# Patient Record
Sex: MALE | Race: WHITE | NOT HISPANIC OR LATINO | Employment: OTHER | URBAN - METROPOLITAN AREA
[De-identification: names, ages, dates, MRNs, and addresses within clinical notes are randomized per-mention and may not be internally consistent; named-entity substitution may affect disease eponyms.]

---

## 2022-12-06 ENCOUNTER — HOSPITAL ENCOUNTER (OUTPATIENT)
Facility: HOSPITAL | Age: 62
Discharge: HOME | End: 2022-12-07
Attending: STUDENT IN AN ORGANIZED HEALTH CARE EDUCATION/TRAINING PROGRAM | Admitting: HOSPITALIST
Payer: COMMERCIAL

## 2022-12-06 DIAGNOSIS — R47.81 SLURRED SPEECH: ICD-10-CM

## 2022-12-06 DIAGNOSIS — G45.9 TIA (TRANSIENT ISCHEMIC ATTACK): ICD-10-CM

## 2022-12-06 DIAGNOSIS — R42 VERTIGO: ICD-10-CM

## 2022-12-06 DIAGNOSIS — I63.9 CVA (CEREBRAL VASCULAR ACCIDENT): ICD-10-CM

## 2022-12-06 DIAGNOSIS — I48.91 ATRIAL FIBRILLATION, UNSPECIFIED TYPE: Primary | ICD-10-CM

## 2022-12-06 PROBLEM — R29.810 FACIAL DROOP: Status: ACTIVE | Noted: 2022-12-06

## 2022-12-06 LAB
ALBUMIN SERPL-MCNC: 3.9 GM/DL (ref 3.4–4.8)
ALBUMIN/GLOB SERPL: 1.1 RATIO (ref 1.1–2)
ALP SERPL-CCNC: 57 UNIT/L (ref 40–150)
ALT SERPL-CCNC: 25 UNIT/L (ref 0–55)
APPEARANCE UR: CLEAR
APTT PPP: 27.3 SECONDS (ref 23.2–33.7)
AST SERPL-CCNC: 25 UNIT/L (ref 5–34)
BACTERIA #/AREA URNS AUTO: ABNORMAL /HPF
BASOPHILS # BLD AUTO: 0.03 X10(3)/MCL (ref 0–0.2)
BASOPHILS NFR BLD AUTO: 0.3 %
BILIRUB UR QL STRIP.AUTO: NEGATIVE MG/DL
BILIRUBIN DIRECT+TOT PNL SERPL-MCNC: 1.1 MG/DL
BNP BLD-MCNC: 48.1 PG/ML
BUN SERPL-MCNC: 17.5 MG/DL (ref 8.4–25.7)
CALCIUM SERPL-MCNC: 10.4 MG/DL (ref 8.8–10)
CHLORIDE SERPL-SCNC: 105 MMOL/L (ref 98–107)
CHOLEST SERPL-MCNC: 152 MG/DL
CHOLEST/HDLC SERPL: 4 {RATIO} (ref 0–5)
CO2 SERPL-SCNC: 24 MMOL/L (ref 23–31)
COLOR UR AUTO: YELLOW
CREAT SERPL-MCNC: 1.03 MG/DL (ref 0.73–1.18)
EOSINOPHIL # BLD AUTO: 0.12 X10(3)/MCL (ref 0–0.9)
EOSINOPHIL NFR BLD AUTO: 1.3 %
ERYTHROCYTE [DISTWIDTH] IN BLOOD BY AUTOMATED COUNT: 13.4 % (ref 11.5–17)
GFR SERPLBLD CREATININE-BSD FMLA CKD-EPI: >60 MLS/MIN/1.73/M2
GLOBULIN SER-MCNC: 3.7 GM/DL (ref 2.4–3.5)
GLUCOSE SERPL-MCNC: 86 MG/DL (ref 82–115)
GLUCOSE UR QL STRIP.AUTO: NEGATIVE MG/DL
HCT VFR BLD AUTO: 45.5 % (ref 42–52)
HDLC SERPL-MCNC: 41 MG/DL (ref 35–60)
HGB BLD-MCNC: 14.8 GM/DL (ref 14–18)
IMM GRANULOCYTES # BLD AUTO: 0.03 X10(3)/MCL (ref 0–0.04)
IMM GRANULOCYTES NFR BLD AUTO: 0.3 %
INR BLD: 1.05 (ref 0–1.3)
KETONES UR QL STRIP.AUTO: NEGATIVE MG/DL
LDLC SERPL CALC-MCNC: 88 MG/DL (ref 50–140)
LEUKOCYTE ESTERASE UR QL STRIP.AUTO: ABNORMAL UNIT/L
LYMPHOCYTES # BLD AUTO: 2.98 X10(3)/MCL (ref 0.6–4.6)
LYMPHOCYTES NFR BLD AUTO: 32.6 %
MCH RBC QN AUTO: 31 PG (ref 27–31)
MCHC RBC AUTO-ENTMCNC: 32.5 MG/DL (ref 33–36)
MCV RBC AUTO: 95.2 FL (ref 80–94)
MONOCYTES # BLD AUTO: 1 X10(3)/MCL (ref 0.1–1.3)
MONOCYTES NFR BLD AUTO: 10.9 %
NEUTROPHILS # BLD AUTO: 5 X10(3)/MCL (ref 2.1–9.2)
NEUTROPHILS NFR BLD AUTO: 54.6 %
NITRITE UR QL STRIP.AUTO: NEGATIVE
NRBC BLD AUTO-RTO: 0 %
PH UR STRIP.AUTO: 6 [PH]
PLATELET # BLD AUTO: 246 X10(3)/MCL (ref 130–400)
PMV BLD AUTO: 9.8 FL (ref 7.4–10.4)
POTASSIUM SERPL-SCNC: 4 MMOL/L (ref 3.5–5.1)
PROT SERPL-MCNC: 7.6 GM/DL (ref 5.8–7.6)
PROT UR QL STRIP.AUTO: NEGATIVE MG/DL
PROTHROMBIN TIME: 13.6 SECONDS (ref 12.5–14.5)
RBC # BLD AUTO: 4.78 X10(6)/MCL (ref 4.7–6.1)
RBC #/AREA URNS AUTO: <5 /HPF
RBC UR QL AUTO: NEGATIVE UNIT/L
SODIUM SERPL-SCNC: 140 MMOL/L (ref 136–145)
SP GR UR STRIP.AUTO: 1.02 (ref 1–1.03)
SQUAMOUS #/AREA URNS AUTO: <5 /HPF
TRIGL SERPL-MCNC: 114 MG/DL (ref 34–140)
TROPONIN I SERPL-MCNC: <0.01 NG/ML (ref 0–0.04)
TSH SERPL-ACNC: 1.4 UIU/ML (ref 0.35–4.94)
UROBILINOGEN UR STRIP-ACNC: 1 MG/DL
VLDLC SERPL CALC-MCNC: 23 MG/DL
WBC # SPEC AUTO: 9.2 X10(3)/MCL (ref 4.5–11.5)
WBC #/AREA URNS AUTO: 7 /HPF

## 2022-12-06 PROCEDURE — 99285 EMERGENCY DEPT VISIT HI MDM: CPT | Mod: 25

## 2022-12-06 PROCEDURE — 85730 THROMBOPLASTIN TIME PARTIAL: CPT | Performed by: STUDENT IN AN ORGANIZED HEALTH CARE EDUCATION/TRAINING PROGRAM

## 2022-12-06 PROCEDURE — 63600175 PHARM REV CODE 636 W HCPCS: Performed by: HOSPITALIST

## 2022-12-06 PROCEDURE — G0378 HOSPITAL OBSERVATION PER HR: HCPCS

## 2022-12-06 PROCEDURE — 80053 COMPREHEN METABOLIC PANEL: CPT | Performed by: NURSE PRACTITIONER

## 2022-12-06 PROCEDURE — 93010 ELECTROCARDIOGRAM REPORT: CPT | Mod: ,,, | Performed by: INTERNAL MEDICINE

## 2022-12-06 PROCEDURE — 63600175 PHARM REV CODE 636 W HCPCS: Performed by: NURSE PRACTITIONER

## 2022-12-06 PROCEDURE — 83880 ASSAY OF NATRIURETIC PEPTIDE: CPT | Performed by: STUDENT IN AN ORGANIZED HEALTH CARE EDUCATION/TRAINING PROGRAM

## 2022-12-06 PROCEDURE — 81001 URINALYSIS AUTO W/SCOPE: CPT | Performed by: STUDENT IN AN ORGANIZED HEALTH CARE EDUCATION/TRAINING PROGRAM

## 2022-12-06 PROCEDURE — 84443 ASSAY THYROID STIM HORMONE: CPT | Performed by: NURSE PRACTITIONER

## 2022-12-06 PROCEDURE — 21400001 HC TELEMETRY ROOM

## 2022-12-06 PROCEDURE — 25000003 PHARM REV CODE 250: Performed by: STUDENT IN AN ORGANIZED HEALTH CARE EDUCATION/TRAINING PROGRAM

## 2022-12-06 PROCEDURE — 85025 COMPLETE CBC W/AUTO DIFF WBC: CPT | Performed by: NURSE PRACTITIONER

## 2022-12-06 PROCEDURE — 93010 EKG 12-LEAD: ICD-10-PCS | Mod: ,,, | Performed by: INTERNAL MEDICINE

## 2022-12-06 PROCEDURE — 84484 ASSAY OF TROPONIN QUANT: CPT | Performed by: STUDENT IN AN ORGANIZED HEALTH CARE EDUCATION/TRAINING PROGRAM

## 2022-12-06 PROCEDURE — 80061 LIPID PANEL: CPT | Performed by: NURSE PRACTITIONER

## 2022-12-06 PROCEDURE — 99223 PR INITIAL HOSPITAL CARE,LEVL III: ICD-10-PCS | Mod: ,,, | Performed by: PSYCHIATRY & NEUROLOGY

## 2022-12-06 PROCEDURE — 99223 1ST HOSP IP/OBS HIGH 75: CPT | Mod: ,,, | Performed by: PSYCHIATRY & NEUROLOGY

## 2022-12-06 PROCEDURE — 85610 PROTHROMBIN TIME: CPT | Performed by: NURSE PRACTITIONER

## 2022-12-06 PROCEDURE — 96372 THER/PROPH/DIAG INJ SC/IM: CPT | Performed by: NURSE PRACTITIONER

## 2022-12-06 PROCEDURE — 93005 ELECTROCARDIOGRAM TRACING: CPT

## 2022-12-06 RX ORDER — AMLODIPINE BESYLATE 10 MG/1
10 TABLET ORAL EVERY MORNING
COMMUNITY
Start: 2022-11-17

## 2022-12-06 RX ORDER — NAPROXEN SODIUM 220 MG/1
243 TABLET, FILM COATED ORAL ONCE
Status: COMPLETED | OUTPATIENT
Start: 2022-12-06 | End: 2022-12-06

## 2022-12-06 RX ORDER — ASPIRIN 81 MG/1
81 TABLET ORAL DAILY
Status: DISCONTINUED | OUTPATIENT
Start: 2022-12-07 | End: 2022-12-07

## 2022-12-06 RX ORDER — ATORVASTATIN CALCIUM 40 MG/1
40 TABLET, FILM COATED ORAL DAILY
Status: DISCONTINUED | OUTPATIENT
Start: 2022-12-07 | End: 2022-12-07 | Stop reason: HOSPADM

## 2022-12-06 RX ORDER — ASPIRIN 325 MG
325 TABLET, DELAYED RELEASE (ENTERIC COATED) ORAL
Status: DISCONTINUED | OUTPATIENT
Start: 2022-12-06 | End: 2022-12-06

## 2022-12-06 RX ORDER — HALOPERIDOL 5 MG/ML
2 INJECTION INTRAMUSCULAR ONCE
Status: COMPLETED | OUTPATIENT
Start: 2022-12-06 | End: 2022-12-06

## 2022-12-06 RX ORDER — SIMVASTATIN 40 MG/1
40 TABLET, FILM COATED ORAL NIGHTLY
Status: ON HOLD | COMMUNITY
Start: 2022-11-17 | End: 2022-12-07 | Stop reason: HOSPADM

## 2022-12-06 RX ORDER — SODIUM CHLORIDE 0.9 % (FLUSH) 0.9 %
10 SYRINGE (ML) INJECTION
Status: DISCONTINUED | OUTPATIENT
Start: 2022-12-06 | End: 2022-12-07 | Stop reason: HOSPADM

## 2022-12-06 RX ORDER — HYDROCHLOROTHIAZIDE 25 MG/1
25 TABLET ORAL EVERY MORNING
COMMUNITY
Start: 2022-11-17

## 2022-12-06 RX ORDER — ENOXAPARIN SODIUM 100 MG/ML
40 INJECTION SUBCUTANEOUS EVERY 24 HOURS
Status: DISCONTINUED | OUTPATIENT
Start: 2022-12-06 | End: 2022-12-07

## 2022-12-06 RX ORDER — METOPROLOL TARTRATE 1 MG/ML
5 INJECTION, SOLUTION INTRAVENOUS EVERY 6 HOURS PRN
Status: DISCONTINUED | OUTPATIENT
Start: 2022-12-06 | End: 2022-12-07 | Stop reason: HOSPADM

## 2022-12-06 RX ADMIN — ASPIRIN 81 MG CHEWABLE TABLET 243 MG: 81 TABLET CHEWABLE at 03:12

## 2022-12-06 RX ADMIN — HALOPERIDOL LACTATE 2 MG: 5 INJECTION, SOLUTION INTRAMUSCULAR at 05:12

## 2022-12-06 RX ADMIN — ENOXAPARIN SODIUM 40 MG: 40 INJECTION SUBCUTANEOUS at 04:12

## 2022-12-06 RX ADMIN — HALOPERIDOL LACTATE 2 MG: 5 INJECTION, SOLUTION INTRAMUSCULAR at 04:12

## 2022-12-06 NOTE — SUBJECTIVE & OBJECTIVE
No past medical history on file.    No past surgical history on file.    Review of patient's allergies indicates:  No Known Allergies    Current Neurological Medications:     No current facility-administered medications on file prior to encounter.     No current outpatient medications on file prior to encounter.     Family History    None       Tobacco Use    Smoking status: Not on file    Smokeless tobacco: Not on file   Substance and Sexual Activity    Alcohol use: Not on file    Drug use: Not on file    Sexual activity: Not on file     Review of Systems  A 14pt ros was reviewed & is negative unless o/w documented in the hpi    Objective:     Vital Signs (Most Recent):  Temp: 98.4 °F (36.9 °C) (12/06/22 1210)  Pulse: 79 (12/06/22 1345)  Resp: 17 (12/06/22 1345)  BP: (!) 139/90 (12/06/22 1345)  SpO2: 97 % (12/06/22 1345)   Vital Signs (24h Range):  Temp:  [98.4 °F (36.9 °C)] 98.4 °F (36.9 °C)  Pulse:  [68-79] 79  Resp:  [17-18] 17  SpO2:  [97 %-100 %] 97 %  BP: (139-147)/() 139/90     Weight: 113.4 kg (250 lb)  Body mass index is 34.87 kg/m².    Physical Exam  Vitals reviewed.   Constitutional:       General: He is awake.      Appearance: Normal appearance.   HENT:      Head: Normocephalic and atraumatic.      Nose: Nose normal.      Mouth/Throat:      Mouth: Mucous membranes are moist.      Pharynx: Oropharynx is clear.   Eyes:      Extraocular Movements: Extraocular movements intact and EOM normal.      Pupils: Pupils are equal, round, and reactive to light.   Pulmonary:      Effort: Pulmonary effort is normal.   Skin:     General: Skin is warm.   Neurological:      Mental Status: He is alert and oriented to person, place, and time.      Coordination: Finger-Nose-Finger Test and Heel to Shin Test normal.   Psychiatric:         Speech: Speech normal.         Behavior: Behavior is cooperative.       NEUROLOGICAL EXAMINATION:     MENTAL STATUS   Oriented to person, place, and time.   Follows 3 step commands.    Attention: normal. Concentration: normal.   Speech: speech is normal   Level of consciousness: alert  Knowledge: good.   Able to name object. Normal comprehension.     CRANIAL NERVES     CN II   Visual fields full to confrontation.   Left visual field deficit: normal visual acuity, with some mild blurred vision.    CN III, IV, VI   Pupils are equal, round, and reactive to light.  Extraocular motions are normal.   Nystagmus: none   Conjugate gaze: present    CN V   Right facial sensation deficit: none  Left facial sensation deficit: cheeks and forehead  Right corneal reflex: normal  Left corneal reflex: normal    CN VII   Right facial weakness: none  Left facial weakness: central    CN XI   CN XI normal.     CN XII   CN XII normal.     MOTOR EXAM   Muscle bulk: normal  Overall muscle tone: normal  Right arm pronator drift: absent  Left arm pronator drift: absent    Strength   Right deltoid: 5/5  Left deltoid: 5/5  Right biceps: 5/5  Left biceps: 5/5  Right triceps: 5/5  Left triceps: 5/5  Right quadriceps: 5/5  Left quadriceps: 5/5  Right hamstrin/5  Left hamstrin/5  Right glutei: 5/5  Left glutei: 5/5  Right anterior tibial: 5/5  Left anterior tibial: 5/5  Right posterior tibial: 5/5  Left posterior tibial: 5/5    REFLEXES     Reflexes   Right plantar: upgoing  Left plantar: upgoing  Right ankle clonus: absent  Left ankle clonus: present    SENSORY EXAM   Light touch normal.     GAIT AND COORDINATION      Coordination   Finger to nose coordination: normal  Heel to shin coordination: normal    Significant Labs:   Recent Lab Results         22  1349   22  1337   22  1229   22  1228        Albumin/Globulin Ratio     1.1         Albumin     3.9         Alkaline Phosphatase     57         ALT     25         Appearance, UA Clear             aPTT   27.3  Comment: For Minimal Heparin Infusion, the goal aPTT 64-85 seconds corresponds to an anti-Xa of 0.3-0.5.    For Low Intensity and High  Intensity Heparin, the goal aPTT  seconds corresponds to an anti-Xa of 0.3-0.7           AST     25         Bacteria, UA None Seen             Baso #     0.03         Basophil %     0.3         BILIRUBIN TOTAL     1.1         Bilirubin, UA Negative             BNP   48.1           BUN     17.5         Calcium     10.4         Chloride     105         Cholesterol   152           CO2     24         Color, UA Yellow             Creatinine     1.03         eGFR     >60         Eos #     0.12         Eosinophil %     1.3         Globulin, Total     3.7         Glucose     86         Glucose, UA Negative             HDL   41           HEMATOCRIT     45.5         HEMOGLOBIN     14.8         Immature Grans (Abs)     0.03         Immature Granulocytes     0.3         INR       1.05       Ketones, UA Negative             LDL Cholesterol External   88.00           Leukocytes, UA 1+             Lymph #     2.98         LYMPH %     32.6         MCH     31.0         MCHC     32.5         MCV     95.2         Mono #     1.00         Mono %     10.9         MPV     9.8         Neut #     5.0         Neut %     54.6         NITRITE UA Negative             nRBC     0.0         Occult Blood UA Negative             pH, UA 6.0             Platelets     246         Potassium     4.0         PROTEIN TOTAL     7.6         Protein, UA Negative             Protime       13.6       RBC     4.78         RBC, UA <5             RDW     13.4         Sodium     140         Specific Gravity,UA 1.016             Squam Epithel, UA <5             Thyroid Stimulating Hormone   1.3986           Total Cholesterol/HDL Ratio   4           Triglycerides   114           Troponin I   <0.010           Urobilinogen, UA 1.0             Very Low Density Lipoprotein   23           WBC, UA 7             WBC     9.2                 Significant Imaging:  CT head w/o 12/6/2022:  FINDINGS:  There is no acute cortical infarct, hemorrhage or mass lesion.  The  ventricles are normal in size.     Visualized paranasal sinuses and mastoid air cells are clear.  There are vascular calcifications.     Impression:     No acute intracranial findings.        I have reviewed all pertinent imaging results/findings within the past 24 hours.

## 2022-12-06 NOTE — HPI
62 y/o male with PMH HTN and HLD who presented to ED on 12/06 for speech disturbance, blurred vision, and difficulty with writing with or UE that began yesterday.      Patient reports several months ago acute onset RLE numbness that lasted approximately 30 minutes and spontaneously resolved.  Approximately 2-3 days later LLE numbness that also lasted 30 minutes and resolved spontaneously.  Reports on the evening of 12/5 acute onset inability to perform fine motor movements with RUE, such as writing.  Patient is right arm dominant.  Patient reports waiting for it to spontaneously resolve overnight, however upon awakening this morning symptoms persisted.  Patient seen by PCP just PTA, where his PCP noted some speech disturbances patient reports he was then having blurred vision with increased lacrimation to left eye.  Patient instructed by PCP to go to ED for further evaluation.    Patient denies any injuries, trauma, or falls prior to onset of any symptoms.    On ED presentation patient was noted to have atrial fibrillation which has persisted.  Patient denies any known history of irregular heartbeat.    CT head w/o negative for acute intracranial findings

## 2022-12-06 NOTE — CONSULTS
[facility] - Emergency Dept  Neurology  Consult Note    Patient Name: Lenny Hughes  MRN: [id number]  Admission Date: 12/6/2022  Hospital Length of Stay: 0 days  Code Status: Full Code   Attending Provider: Vinicius Dyer MD   Consulting Provider: Deborah Maldonado [facility]  Primary Care Physician: Ganesh Givens MD  Principal Problem:<principal problem not specified>    Inpatient consult to Neurology  Consult performed by: JEREMY Victor  Consult ordered by: Michael Gonzales MD         Subjective:     Chief Complaint:       HPI:   60 y/o male with PMH HTN and HLD who presented to ED on 12/06 for speech disturbance, blurred vision, and difficulty with writing with or UE that began yesterday.      Patient reports several months ago acute onset RLE numbness that lasted approximately 30 minutes and spontaneously resolved.  Approximately 2-3 days later LLE numbness that also lasted 30 minutes and resolved spontaneously.  Reports on the evening of 12/5 acute onset inability to perform fine motor movements with RUE, such as writing.  Patient is right arm dominant.  Patient reports waiting for it to spontaneously resolve overnight, however upon awakening this morning symptoms persisted.  Patient seen by PCP just PTA, where his PCP noted some speech disturbances patient reports he was then having blurred vision with increased lacrimation to left eye.  Patient instructed by PCP to go to ED for further evaluation.    Patient denies any injuries, trauma, or falls prior to onset of any symptoms.    On ED presentation patient was noted to have atrial fibrillation which has persisted.  Patient denies any known history of irregular heartbeat.    CT head w/o negative for acute intracranial findings       No past medical history on file.    No past surgical history on file.    Review of patient's allergies indicates:  No Known Allergies    Current Neurological Medications:     No current facility-administered medications  on file prior to encounter.     No current outpatient medications on file prior to encounter.     Family History    None       Tobacco Use    Smoking status: Not on file    Smokeless tobacco: Not on file   Substance and Sexual Activity    Alcohol use: Not on file    Drug use: Not on file    Sexual activity: Not on file     Review of Systems  A 14pt ros was reviewed & is negative unless o/w documented in the hpi    Objective:     Vital Signs (Most Recent):  Temp: 98.4 °F (36.9 °C) (12/06/22 1210)  Pulse: 79 (12/06/22 1345)  Resp: 17 (12/06/22 1345)  BP: (!) 139/90 (12/06/22 1345)  SpO2: 97 % (12/06/22 1345)   Vital Signs (24h Range):  Temp:  [98.4 °F (36.9 °C)] 98.4 °F (36.9 °C)  Pulse:  [68-79] 79  Resp:  [17-18] 17  SpO2:  [97 %-100 %] 97 %  BP: (139-147)/() 139/90     Weight: 113.4 kg (250 lb)  Body mass index is 34.87 kg/m².    Physical Exam  Vitals reviewed.   Constitutional:       General: He is awake.      Appearance: Normal appearance.   HENT:      Head: Normocephalic and atraumatic.      Nose: Nose normal.      Mouth/Throat:      Mouth: Mucous membranes are moist.      Pharynx: Oropharynx is clear.   Eyes:      Extraocular Movements: Extraocular movements intact and EOM normal.      Pupils: Pupils are equal, round, and reactive to light.   Pulmonary:      Effort: Pulmonary effort is normal.   Skin:     General: Skin is warm.   Neurological:      Mental Status: He is alert and oriented to person, place, and time.      Coordination: Finger-Nose-Finger Test and Heel to Shin Test normal.   Psychiatric:         Speech: Speech normal.         Behavior: Behavior is cooperative.       NEUROLOGICAL EXAMINATION:     MENTAL STATUS   Oriented to person, place, and time.   Follows 3 step commands.   Attention: normal. Concentration: normal.   Speech: speech is normal   Level of consciousness: alert  Knowledge: good.   Able to name object. Normal comprehension.     CRANIAL NERVES     CN II   Visual fields full to  confrontation.   Left visual field deficit: normal visual acuity, with some mild blurred vision.    CN III, IV, VI   Pupils are equal, round, and reactive to light.  Extraocular motions are normal.   Nystagmus: none   Conjugate gaze: present    CN V   Right facial sensation deficit: none  Left facial sensation deficit: cheeks and forehead  Right corneal reflex: normal  Left corneal reflex: normal    CN VII   Right facial weakness: none  Left facial weakness: central    CN XI   CN XI normal.     CN XII   CN XII normal.     MOTOR EXAM   Muscle bulk: normal  Overall muscle tone: normal  Right arm pronator drift: absent  Left arm pronator drift: absent    Strength   Right deltoid: 5/5  Left deltoid: 5/5  Right biceps: 5/5  Left biceps: 5/5  Right triceps: 5/5  Left triceps: 5/5  Right quadriceps: 5/5  Left quadriceps: 5/5  Right hamstrin/5  Left hamstrin/5  Right glutei: 5/5  Left glutei: 5/5  Right anterior tibial: 5/5  Left anterior tibial: 5/5  Right posterior tibial: 5/5  Left posterior tibial: 5/5    REFLEXES     Reflexes   Right plantar: upgoing  Left plantar: upgoing  Right ankle clonus: absent  Left ankle clonus: present    SENSORY EXAM   Light touch normal.     GAIT AND COORDINATION      Coordination   Finger to nose coordination: normal  Heel to shin coordination: normal    Significant Labs:   Recent Lab Results         22  1349   22  1337   22  1229   22  1228        Albumin/Globulin Ratio     1.1         Albumin     3.9         Alkaline Phosphatase     57         ALT     25         Appearance, UA Clear             aPTT   27.3  Comment: For Minimal Heparin Infusion, the goal aPTT 64-85 seconds corresponds to an anti-Xa of 0.3-0.5.    For Low Intensity and High Intensity Heparin, the goal aPTT  seconds corresponds to an anti-Xa of 0.3-0.7           AST     25         Bacteria, UA None Seen             Baso #     0.03         Basophil %     0.3         BILIRUBIN TOTAL      1.1         Bilirubin, UA Negative             BNP   48.1           BUN     17.5         Calcium     10.4         Chloride     105         Cholesterol   152           CO2     24         Color, UA Yellow             Creatinine     1.03         eGFR     >60         Eos #     0.12         Eosinophil %     1.3         Globulin, Total     3.7         Glucose     86         Glucose, UA Negative             HDL   41           HEMATOCRIT     45.5         HEMOGLOBIN     14.8         Immature Grans (Abs)     0.03         Immature Granulocytes     0.3         INR       1.05       Ketones, UA Negative             LDL Cholesterol External   88.00           Leukocytes, UA 1+             Lymph #     2.98         LYMPH %     32.6         MCH     31.0         MCHC     32.5         MCV     95.2         Mono #     1.00         Mono %     10.9         MPV     9.8         Neut #     5.0         Neut %     54.6         NITRITE UA Negative             nRBC     0.0         Occult Blood UA Negative             pH, UA 6.0             Platelets     246         Potassium     4.0         PROTEIN TOTAL     7.6         Protein, UA Negative             Protime       13.6       RBC     4.78         RBC, UA <5             RDW     13.4         Sodium     140         Specific Gravity,UA 1.016             Squam Epithel, UA <5             Thyroid Stimulating Hormone   1.3986           Total Cholesterol/HDL Ratio   4           Triglycerides   114           Troponin I   <0.010           Urobilinogen, UA 1.0             Very Low Density Lipoprotein   23           WBC, UA 7             WBC     9.2                 Significant Imaging:  CT head w/o 12/6/2022:  FINDINGS:  There is no acute cortical infarct, hemorrhage or mass lesion.  The ventricles are normal in size.     Visualized paranasal sinuses and mastoid air cells are clear.  There are vascular calcifications.     Impression:     No acute intracranial findings.        I have reviewed all pertinent  imaging results/findings within the past 24 hours.    Assessment and Plan:     Facial droop  To L side with L facial numbness    -await MRI brain w/o  -normotension  -PT/OT/ST  -pt given  mg  -continue ASA 81 mg daily and atorvastatin 40 mg daily  -defer to MD for OAC recs d/t a. fib  -fall precautions        VTE Risk Mitigation (From admission, onward)           Ordered     enoxaparin injection 40 mg  Daily         12/06/22 1514     IP VTE HIGH RISK PATIENT  Once         12/06/22 1514     Place sequential compression device  Until discontinued         12/06/22 1514                    Thank you for your consult.  Further recommendations to follow per MD Deborah Maldonado, AGACNP-BC  Inpatient Neurology  [facility] - Emergency Dept

## 2022-12-06 NOTE — ED PROVIDER NOTES
Encounter Date: 12/6/2022    SCRIBE #1 NOTE: I, Bessie Paez, am scribing for, and in the presence of,  Michael Gonzales MD. I have scribed the following portions of the note - Other sections scribed: HPI, ROS,PE.     History     Chief Complaint   Patient presents with    Blurred Vision     Pt reports difficulty writing that started yesterday at 1400. Pt also reports blurred vision in left eye that started this morning. Sent by PCP for eval. Ambulatory in triage. No slurred speech, facial droop or weakness noted. Hx HTN & HLD.     61 year old male with a history of HTN and HLD presents to ED, from his PCP's office, after noticing a vision change.  Pt reports that he noticed his handwriting (right hand) was becoming illegible this morning and blurry vision when to his PCP's office. His PCP sent him to the ED, because he said he noticed a change in the pt's speech.  Pt denies any falls or injuries to his right hand.    The history is provided by the patient.   Illness   The current episode started just prior to arrival. The problem occurs frequently. The problem has been gradually improving. Nothing relieves the symptoms. Recently, medical care has been given by the PCP.   Review of patient's allergies indicates:  No Known Allergies  Past Medical History:   Diagnosis Date    Hyperlipidemia     Hypertension     Stroke      Past Surgical History:   Procedure Laterality Date    BACK SURGERY       Family History   Problem Relation Age of Onset    Atrial fibrillation Mother     Crohn's disease Mother     Heart disease Father     Heart disease Brother      Social History     Tobacco Use    Smoking status: Former     Types: Cigarettes     Quit date: 2019     Years since quitting: 3.9    Smokeless tobacco: Never   Substance Use Topics    Alcohol use: Yes     Alcohol/week: 10.0 standard drinks     Types: 10 Cans of beer per week     Comment: daily    Drug use: Never     Review of Systems   Constitutional: Negative.     HENT: Negative.     Respiratory: Negative.     Cardiovascular: Negative.    Gastrointestinal: Negative.    Endocrine: Negative.    Genitourinary: Negative.    Musculoskeletal: Negative.    Skin: Negative.    Allergic/Immunologic: Negative.    Neurological: Negative.         Changes to fine motor skills   Hematological: Negative.    Psychiatric/Behavioral: Negative.     All other systems reviewed and are negative.    Physical Exam     Initial Vitals [12/06/22 0133]   BP Pulse Resp Temp SpO2   122/80 82 17 97.5 °F (36.4 °C) 95 %      MAP       --         Physical Exam    Nursing note and vitals reviewed.  Constitutional: He appears well-developed and well-nourished. He is not diaphoretic. He does not appear ill. No distress.   HENT:   Head: Normocephalic and atraumatic.   Mouth/Throat: Oropharynx is clear and moist.   Eyes: Conjunctivae and EOM are normal. Pupils are equal, round, and reactive to light.   Neck: Neck supple.   Normal range of motion.  Cardiovascular:  Normal rate, regular rhythm, normal heart sounds and intact distal pulses.           No murmur heard.  Pulmonary/Chest: Breath sounds normal. No respiratory distress. He has no wheezes. He has no rales. He exhibits no tenderness.   Abdominal: Abdomen is soft. Bowel sounds are normal. He exhibits no distension. There is no abdominal tenderness.   Musculoskeletal:         General: No tenderness or edema. Normal range of motion.      Cervical back: Normal range of motion and neck supple.      Lumbar back: Normal. No tenderness. Normal range of motion.     Neurological: He is alert and oriented to person, place, and time. He has normal strength. No cranial nerve deficit or sensory deficit.   Skin: Skin is warm and dry. Capillary refill takes less than 2 seconds. No rash noted. No erythema.   Psychiatric: He has a normal mood and affect. His mood appears not anxious.       ED Course   Procedures  Labs Reviewed   COMPREHENSIVE METABOLIC PANEL - Abnormal;  Notable for the following components:       Result Value    Calcium Level Total 10.4 (*)     Globulin 3.7 (*)     All other components within normal limits   CBC WITH DIFFERENTIAL - Abnormal; Notable for the following components:    MCV 95.2 (*)     MCHC 32.5 (*)     All other components within normal limits   URINALYSIS, REFLEX TO URINE CULTURE - Abnormal; Notable for the following components:    Leukocyte Esterase, UA 1+ (*)     All other components within normal limits   URINALYSIS, MICROSCOPIC - Abnormal; Notable for the following components:    WBC, UA 7 (*)     All other components within normal limits   PROTIME-INR - Normal   APTT - Normal   B-TYPE NATRIURETIC PEPTIDE - Normal   TROPONIN I - Normal   TSH - Normal   CBC W/ AUTO DIFFERENTIAL    Narrative:     The following orders were created for panel order CBC Auto Differential.  Procedure                               Abnormality         Status                     ---------                               -----------         ------                     CBC with Differential[[id number]]        Abnormal            Final result                 Please view results for these tests on the individual orders.   LIPID PANEL     EKG Readings: (Independently Interpreted)   Afib, Rate of 82.  No STEMI.    ECG Results              EKG 12-lead (Final result)  Result time 12/07/22 21:19:12      Final result by Interface, Lab In [facility] (12/07/22 21:19:12)                   Narrative:    Test Reason : R47.81,    Vent. Rate : 082 BPM     Atrial Rate : 000 BPM     P-R Int : 000 ms          QRS Dur : 094 ms      QT Int : 362 ms       P-R-T Axes : 000 069 039 degrees     QTc Int : 422 ms    Atrial fibrillation  Abnormal ECG    Confirmed by Levi DE LUNA, Fabian ([id number]) on 12/7/2022 9:19:05 PM    Referred By: [email or web address]   SELF           Confirmed By:Fabian Sims MD                                     EKG 12-LEAD (Final result)  Result time 12/19/22 12:47:54      Final result by Unknown User  (12/19/22 12:47:54)                                      Imaging Results              MRI Brain Without Contrast (Final result)  Result time 12/06/22 18:25:36      Final result by Justo Riley MD (12/06/22 18:25:36)                   Impression:      Chronic age related changes    Otherwise unremarkable      Electronically signed by: Justo Riley  Date:    12/06/2022  Time:    18:25               Narrative:    EXAMINATION:  MRI BRAIN WITHOUT CONTRAST    CLINICAL HISTORY:  Stroke, follow up;    TECHNIQUE:  Multiplanar multisequence MR imaging of the brain was performed without contrast.    COMPARISON:  CT scan dated 12/06/2022    FINDINGS:  No intracranial mass or lesion is seen.  No hemorrhage is seen.  No acute infarct is seen.  No diffusion abnormality seen.  There is diffuse cerebral atrophy seen along with some compensatory ventricular dilatation and some subcortical white matter change consistent with patient's age.  Posterior fossa appears normal.  Calvarium is intact.  Paranasal sinuses appear grossly unremarkable.                                       CT Head Without Contrast (Final result)  Result time 12/06/22 12:50:20      Final result by Pollo Henry MD (12/06/22 12:50:20)                   Impression:      No acute intracranial findings.      Electronically signed by: Pollo Henry  Date:    12/06/2022  Time:    12:50               Narrative:    EXAMINATION:  CT HEAD WITHOUT CONTRAST    CLINICAL HISTORY:  Neuro deficit, acute, stroke suspected;    TECHNIQUE:  CT imaging of the head performed from the skull base to the vertex without intravenous contrast.  mGycm. Automatic exposure control, adjustment of mA/kV or iterative reconstruction technique was used to reduce radiation.    COMPARISON:  None Available.    FINDINGS:  There is no acute cortical infarct, hemorrhage or mass lesion.  The ventricles are normal in size.    Visualized paranasal sinuses and mastoid air cells are  clear.  There are vascular calcifications.                                       Medications   aspirin chewable tablet 243 mg (243 mg Oral Given 12/6/22 1555)   haloperidol lactate injection 2 mg (2 mg Intravenous Given 12/6/22 1635)   haloperidol lactate injection 2 mg (2 mg Intravenous Given 12/6/22 1713)     Medical Decision Making:   History:   I obtained history from: primary care / consultant.       <> Summary of History: Discussed with Dr. Dyer  Old Records Summarized: other records.       <> Summary of Records: Reviewed previous records available in [facility]  Clinical Tests:   Lab Tests: Ordered and Reviewed  Radiological Study: Ordered and Reviewed  Medical Tests: Reviewed and Ordered  ED Management:  Patient is a 63 y/o male presents from PCP for concern for CVA.  See HPI/exam.  NIH as noted.  Given aspirin. E KG with evidence of afib.  Not a TPA/TNKase candidate.  No indication for CTA.  Admitted; mri ordered.  Results discussed.  Answered all questions at this time.  Verbalized understanding and agreed to plan.   Additional MDM:     NIH Stroke Scale:   Interval = baseline (upon arrival/admit)  Level of consciousness = 0 - alert  LOC questions = 0 - answers both correctly  LOC commands = 0 - performs both correctly  Best gaze = 0 - normal  Visual = 0 - no visual loss  Facial palsy = 0 - normal  Motor left arm =  0 - no drift  Motor right arm =  0 - no drift  Motor left leg = 0 - no drift  Motor right leg =  0 - no drift  Limb ataxia = 0 - absent  Sensory = 0 - normal  Best language = 0 - no aphasia  Dysarthria = 0 - normal articulation  Extinction and inattention = 0 - no neglect  NIH Stroke Scale Total = 0     Scribe Attestation:   Scribe #1: I performed the above scribed service and the documentation accurately describes the services I performed. I attest to the accuracy of the note.    Attending Attestation:           Physician Attestation for Scribe:  Physician Attestation Statement for Scribe #1: I,  Michael Gonzales MD, reviewed documentation, as scribed by Bessie Paez in my presence, and it is both accurate and complete.                        Clinical Impression:   Final diagnoses:  [R47.81] Slurred speech  [I48.91] Atrial fibrillation, unspecified type (Primary)  [G45.9] TIA (transient ischemic attack)        ED Disposition Condition    Admit Stable                Michael Gonzales MD  12/28/22 1132

## 2022-12-06 NOTE — Clinical Note
Diagnosis: Slurred speech [692100]   Admitting Provider:: FRANCES GLEASON [[id number]]   Future Attending Provider: FRANCES GLEASON [[id number]]   Reason for IP Medical Treatment  (Clinical interventions that can only be accomplished in the IP setting? ) :: CVA, new onset afib   Estimated Length of Stay:: 2 midnights   I certify that Inpatient services for greater than or equal to 2 midnights are medically necessary:: Yes   Plans for Post-Acute care--if anticipated (pick the single best option):: A. No post acute care anticipated at this time

## 2022-12-06 NOTE — H&P
[facility]  Hospital Medicine History & Physical Examination       Patient Name: Lenny Hughes  MRN: [id number]  Patient Class: IP- Inpatient   Admission Date: 12/6/2022   Admitting Physician: HM Service   Length of Stay: 0  Attending Physician: Dr. Vinicius Dyer  Primary Care Provider: Ganesh Givens MD  Face-to-Face encounter date: 12/06/2022  Code Status: Full code  Chief Complaint: Blurred Vision (Pt reports difficulty writing that started yesterday at 1400. Pt also reports blurred vision in left eye that started this morning. Sent by PCP for eval. Ambulatory in triage. No slurred speech, facial droop or weakness noted. Hx HTN & HLD.)        Patient information was obtained from patient, patient's family, past medical records and ER records.     HISTORY OF PRESENT ILLNESS:   Lenny Hughes is a 61 y.o. male who PMH includes HTN, HLD; presented to the ED at [facility] on 12/6/2022  sent by his PCP with reports of slurred speech, blurred vision and difficulty writin which started yesterday around 1400. Pt went to his PCP and was sent to the ED for further evaluation. PT reported speech alteration yesterday and blurred vision in left eye thich started this am with associated difficulty writing which he was directed to come to the ED per his PCP. No reports of CVA in the past, denies any injuries, trauma, or falls. Labs done unremarkable. CT of head without contrast demonstrated no acute intracranial findings. Pt denies any CP, SOB, N/V/D, fever, cough, congestion, or any sick contacts.   Initial VS /104 pulse 68 respirations 18 temperature 98.4° F O2 saturation 100% on room air.  Patient is admitted to hospital medicine services for further management.      PAST MEDICAL HISTORY:   HTN  HLD  Anxiety    PAST SURGICAL HISTORY:   Endoscopy    ALLERGIES:   Patient has no known allergies.    FAMILY HISTORY:   Reviewed and negative    SOCIAL HISTORY:   No reports alcohol use   No reports of  illicit drug use   No reports of tobacco use   Lives with family     HOME MEDICATIONS:   As documented:  Amlodipine 10 mg po daily  HCTZ 25 mg PO daily  Simvastatin 40 mg po daily    REVIEW OF SYSTEMS:   Except as documented, all other systems reviewed and negative     PHYSICAL EXAM:     VITAL SIGNS: 24 HRS MIN & MAX LAST   Temp  Min: 98.4 °F (36.9 °C)  Max: 98.4 °F (36.9 °C) 98.4 °F (36.9 °C)   BP  Min: 139/90  Max: 147/104 (!) 139/90     Pulse  Min: 68  Max: 79  79   Resp  Min: 17  Max: 18 17   SpO2  Min: 97 %  Max: 100 % 97 %       General appearance: Well-developed, well-nourished male , anxious, non-toxic, in no apparent distress, wife at bedside  HENT: Atraumatic head. Moist mucous membranes of oral cavity.  Eyes: Normal extraocular movements, PERRL  Neck: Supple.   Lungs: Clear to auscultation bilaterally. No wheezing present.   Heart: Regular rate and rhythm. S1 and S2 present with no murmurs/gallop/rub. No pedal edema. No JVD present.   Abdomen: Soft, non-distended, non-tender. Obese, no rebound tenderness/guarding. Bowel sounds are normal.   Extremities: CORREIA, atraumatic, no deformity  Skin: warm and dry  Neuro: AAOx3, no slurred speech, , no deficits; neuro examination unremarkable  Psych/mental status: responds appropriately to questions    LABS AND IMAGING:     Recent Labs   Lab 12/06/22  1229   WBC 9.2   RBC 4.78   HGB 14.8   HCT 45.5   MCV 95.2*   MCH 31.0   MCHC 32.5*   RDW 13.4      MPV 9.8       Recent Labs   Lab 12/06/22  1229      K 4.0   CO2 24   BUN 17.5   CREATININE 1.03   CALCIUM 10.4*   ALBUMIN 3.9   ALKPHOS 57   ALT 25   AST 25   BILITOT 1.1       Microbiology Results (last 7 days)       ** No results found for the last 168 hours. **             CT Head Without Contrast  Narrative: EXAMINATION:  CT HEAD WITHOUT CONTRAST    CLINICAL HISTORY:  Neuro deficit, acute, stroke suspected;    TECHNIQUE:  CT imaging of the head performed from the skull base to the vertex without  intravenous contrast.  mGycm. Automatic exposure control, adjustment of mA/kV or iterative reconstruction technique was used to reduce radiation.    COMPARISON:  None Available.    FINDINGS:  There is no acute cortical infarct, hemorrhage or mass lesion.  The ventricles are normal in size.    Visualized paranasal sinuses and mastoid air cells are clear.  There are vascular calcifications.  Impression: No acute intracranial findings.    Electronically signed by: Pollo Henry  Date:    12/06/2022  Time:    12:50        ASSESSMENT & PLAN:   ASSESSMENT:  Acute CVA- r/o vs TIA  Acute aphasia with slurred speech- resolved suspected secondary CVA vs TIA  Left eye blurred vision- resovled, suspected secondary to CVA vs TIA  HTN- urgency  Generalized anxiety disorder- mild exacerbation  HLD- on statin therapy    PLAN:  Consult Neurology Services appreciate assistance and recommendations   Permissive hypertension for 24 hours which stroke workup   Fall precautions   Continue with imaging and diagnostics per CVA workup   Repeat lab work in a.m.   PRN anxiolytic therapy   Resume home medication as deemed necessary once able to take p.o.      VTE Prophylaxis: Lovenox  for DVT prophylaxis and will be advised to be as mobile as possible and sit in a chair as tolerated    Patient condition:  Stable  __________________________________________________________________________  INPATIENT LIST OF MEDICATIONS     Scheduled Meds:   aspirin  325 mg Oral ED 1 Time    [START ON 12/7/2022] aspirin  81 mg Oral Daily    [START ON 12/7/2022] atorvastatin  40 mg Oral Daily    enoxaparin  40 mg Subcutaneous Daily     Continuous Infusions:  PRN Meds:.metoprolol, sodium chloride 0.9%      IAlexys FNP have reviewed and discussed the case with Dr. Vinicius Dyer.  Please see the following addendum for further assessment and plan from there attending MD.  JEREMY Gutierrez    12/06/2022    ________________________________________________________________________________    MD Addendum:  For this patient encounter, I reviewed the NP/PA/resident documentation, treatment plan, and medical decision making; and I had face-to-face time with this patient.   Assumed patient care at  4:49 PM on 12/06/2022      History: Reviewed HPI, medical, surgical, family, and social histories as above    Physical exam: Agree with documentation as above    Treatment Plan:  Patient doing better and essentially symptom free at this time.  He does have a history of high blood pressure and HLD and follows with his PCP (Dr. Givens) regularly. States BP normally well controlled and no previous history of A.fib. He is a former smoker, quit several years ago.  He takes a baby aspirin daily and on Zocor. He previous reported some slurred speech and difficulty writing with his right hand.  No weakness noted.  Symptoms have resolved on evaluation in the ED.  Work up in the ED unremarkable except new A.fib.  He is about to go for a MRI.  Cardiology and neurology have been consulted and the hospitalist group was asked to admit.  I discussed the plan of care with the patient's sister at the bedside and son over the phone.     All diagnosis and differential diagnosis have been reviewed; assessment and plan has been documented; I have personally reviewed the labs and test results that are presently available; I have reviewed the patients medication list; I have reviewed the consulting providers response and recommendations. I have reviewed or attempted to review medical records based upon their availability.    All of the patient and family questions have been addressed and answered. Patient's is agreeable to the above stated plan. I will continue to monitor closely and make adjustments to medical management as needed.

## 2022-12-06 NOTE — FIRST PROVIDER EVALUATION
Medical screening examination initiated.  I have conducted a focused provider triage encounter, findings are as follows:    Brief history of present illness:  Patient states that yesterday afternoon he began with slurred speech, burred vision, and difficulty writing.     There were no vitals filed for this visit.    Pertinent physical exam:  Awake, alert, ambulatory      Brief workup plan:  labs, CT head    Preliminary workup initiated; this workup will be continued and followed by the physician or advanced practice provider that is assigned to the patient when roomed.

## 2022-12-07 VITALS
TEMPERATURE: 98 F | BODY MASS INDEX: 35 KG/M2 | RESPIRATION RATE: 18 BRPM | HEIGHT: 71 IN | OXYGEN SATURATION: 97 % | HEART RATE: 85 BPM | DIASTOLIC BLOOD PRESSURE: 81 MMHG | SYSTOLIC BLOOD PRESSURE: 120 MMHG | WEIGHT: 250 LBS

## 2022-12-07 PROBLEM — I48.91 ATRIAL FIBRILLATION: Status: ACTIVE | Noted: 2022-12-07

## 2022-12-07 LAB
ALBUMIN SERPL-MCNC: 3.6 GM/DL (ref 3.4–4.8)
ALBUMIN/GLOB SERPL: 0.9 RATIO (ref 1.1–2)
ALP SERPL-CCNC: 56 UNIT/L (ref 40–150)
ALT SERPL-CCNC: 26 UNIT/L (ref 0–55)
APTT PPP: 30.1 SECONDS (ref 23.2–33.7)
AST SERPL-CCNC: 27 UNIT/L (ref 5–34)
BASOPHILS # BLD AUTO: 0.04 X10(3)/MCL (ref 0–0.2)
BASOPHILS NFR BLD AUTO: 0.4 %
BILIRUBIN DIRECT+TOT PNL SERPL-MCNC: 1 MG/DL
BUN SERPL-MCNC: 15 MG/DL (ref 8.4–25.7)
CALCIUM SERPL-MCNC: 10.3 MG/DL (ref 8.8–10)
CHLORIDE SERPL-SCNC: 105 MMOL/L (ref 98–107)
CK MB SERPL-MCNC: 1.1 NG/ML
CO2 SERPL-SCNC: 23 MMOL/L (ref 23–31)
CREAT SERPL-MCNC: 0.85 MG/DL (ref 0.73–1.18)
EOSINOPHIL # BLD AUTO: 0.17 X10(3)/MCL (ref 0–0.9)
EOSINOPHIL NFR BLD AUTO: 1.8 %
ERYTHROCYTE [DISTWIDTH] IN BLOOD BY AUTOMATED COUNT: 13.3 % (ref 11.5–17)
EST. AVERAGE GLUCOSE BLD GHB EST-MCNC: 111.2 MG/DL
GFR SERPLBLD CREATININE-BSD FMLA CKD-EPI: >60 MLS/MIN/1.73/M2
GLOBULIN SER-MCNC: 3.9 GM/DL (ref 2.4–3.5)
GLUCOSE SERPL-MCNC: 82 MG/DL (ref 82–115)
HBA1C MFR BLD: 5.5 %
HCT VFR BLD AUTO: 46.8 % (ref 42–52)
HGB BLD-MCNC: 15.2 GM/DL (ref 14–18)
IMM GRANULOCYTES # BLD AUTO: 0.02 X10(3)/MCL (ref 0–0.04)
IMM GRANULOCYTES NFR BLD AUTO: 0.2 %
INR BLD: 1.07 (ref 0–1.3)
LEFT CCA DIST DIAS: 13 CM/S
LEFT CCA DIST SYS: 63 CM/S
LEFT CCA PROX DIAS: 10 CM/S
LEFT CCA PROX SYS: 51 CM/S
LEFT ECA SYS: 103 CM/S
LEFT ICA DIST DIAS: 33 CM/S
LEFT ICA DIST SYS: 92 CM/S
LEFT ICA MID DIAS: 21 CM/S
LEFT ICA MID SYS: 81 CM/S
LEFT ICA PROX DIAS: 19 CM/S
LEFT ICA PROX SYS: 75 CM/S
LEFT VERTEBRAL SYS: 28 CM/S
LYMPHOCYTES # BLD AUTO: 3.09 X10(3)/MCL (ref 0.6–4.6)
LYMPHOCYTES NFR BLD AUTO: 32.5 %
MAGNESIUM SERPL-MCNC: 2 MG/DL (ref 1.6–2.6)
MCH RBC QN AUTO: 31.1 PG (ref 27–31)
MCHC RBC AUTO-ENTMCNC: 32.5 MG/DL (ref 33–36)
MCV RBC AUTO: 95.7 FL (ref 80–94)
MONOCYTES # BLD AUTO: 1.05 X10(3)/MCL (ref 0.1–1.3)
MONOCYTES NFR BLD AUTO: 11.1 %
NEUTROPHILS # BLD AUTO: 5.1 X10(3)/MCL (ref 2.1–9.2)
NEUTROPHILS NFR BLD AUTO: 54 %
NRBC BLD AUTO-RTO: 0 %
OHS CV CAROTID RIGHT ICA EDV HIGHEST: 20
OHS CV CAROTID ULTRASOUND LEFT ICA/CCA RATIO: 1.46
OHS CV CAROTID ULTRASOUND RIGHT ICA/CCA RATIO: 0.92
OHS CV PV CAROTID LEFT HIGHEST CCA: 63
OHS CV PV CAROTID LEFT HIGHEST ICA: 92
OHS CV PV CAROTID RIGHT HIGHEST CCA: 75
OHS CV PV CAROTID RIGHT HIGHEST ICA: 69
OHS CV US CAROTID LEFT HIGHEST EDV: 33
PHOSPHATE SERPL-MCNC: 2.4 MG/DL (ref 2.3–4.7)
PLATELET # BLD AUTO: 242 X10(3)/MCL (ref 130–400)
PMV BLD AUTO: 9.9 FL (ref 7.4–10.4)
POTASSIUM SERPL-SCNC: 3.8 MMOL/L (ref 3.5–5.1)
PROT SERPL-MCNC: 7.5 GM/DL (ref 5.8–7.6)
PROTHROMBIN TIME: 13.8 SECONDS (ref 12.5–14.5)
RBC # BLD AUTO: 4.89 X10(6)/MCL (ref 4.7–6.1)
RIGHT CCA DIST DIAS: 16 CM/S
RIGHT CCA DIST SYS: 75 CM/S
RIGHT CCA PROX DIAS: 16 CM/S
RIGHT CCA PROX SYS: 73 CM/S
RIGHT ECA DIAS: 0 CM/S
RIGHT ECA SYS: 83 CM/S
RIGHT ICA DIST DIAS: 20 CM/S
RIGHT ICA DIST SYS: 56 CM/S
RIGHT ICA MID DIAS: 0 CM/S
RIGHT ICA MID SYS: 69 CM/S
RIGHT ICA PROX DIAS: 14 CM/S
RIGHT ICA PROX SYS: 55 CM/S
RIGHT VERTEBRAL DIAS: 0 CM/S
RIGHT VERTEBRAL SYS: 44 CM/S
SODIUM SERPL-SCNC: 137 MMOL/L (ref 136–145)
TROPONIN I SERPL-MCNC: <0.01 NG/ML (ref 0–0.04)
WBC # SPEC AUTO: 9.5 X10(3)/MCL (ref 4.5–11.5)

## 2022-12-07 PROCEDURE — 85610 PROTHROMBIN TIME: CPT | Performed by: NURSE PRACTITIONER

## 2022-12-07 PROCEDURE — G0378 HOSPITAL OBSERVATION PER HR: HCPCS

## 2022-12-07 PROCEDURE — 92523 SPEECH SOUND LANG COMPREHEN: CPT

## 2022-12-07 PROCEDURE — 84100 ASSAY OF PHOSPHORUS: CPT | Performed by: NURSE PRACTITIONER

## 2022-12-07 PROCEDURE — 82553 CREATINE MB FRACTION: CPT | Performed by: NURSE PRACTITIONER

## 2022-12-07 PROCEDURE — 84484 ASSAY OF TROPONIN QUANT: CPT | Performed by: NURSE PRACTITIONER

## 2022-12-07 PROCEDURE — 25000003 PHARM REV CODE 250: Performed by: NURSE PRACTITIONER

## 2022-12-07 PROCEDURE — 97161 PT EVAL LOW COMPLEX 20 MIN: CPT

## 2022-12-07 PROCEDURE — 85025 COMPLETE CBC W/AUTO DIFF WBC: CPT | Performed by: NURSE PRACTITIONER

## 2022-12-07 PROCEDURE — 85730 THROMBOPLASTIN TIME PARTIAL: CPT | Performed by: NURSE PRACTITIONER

## 2022-12-07 PROCEDURE — 25000003 PHARM REV CODE 250

## 2022-12-07 PROCEDURE — 25000003 PHARM REV CODE 250: Performed by: PSYCHIATRY & NEUROLOGY

## 2022-12-07 PROCEDURE — 80053 COMPREHEN METABOLIC PANEL: CPT | Performed by: NURSE PRACTITIONER

## 2022-12-07 PROCEDURE — 97165 OT EVAL LOW COMPLEX 30 MIN: CPT

## 2022-12-07 PROCEDURE — 83735 ASSAY OF MAGNESIUM: CPT | Performed by: NURSE PRACTITIONER

## 2022-12-07 PROCEDURE — 83036 HEMOGLOBIN GLYCOSYLATED A1C: CPT | Performed by: HOSPITALIST

## 2022-12-07 PROCEDURE — 36415 COLL VENOUS BLD VENIPUNCTURE: CPT | Performed by: NURSE PRACTITIONER

## 2022-12-07 RX ORDER — ATORVASTATIN CALCIUM 40 MG/1
40 TABLET, FILM COATED ORAL DAILY
Qty: 90 TABLET | Refills: 3 | Status: SHIPPED | OUTPATIENT
Start: 2022-12-08 | End: 2023-12-08

## 2022-12-07 RX ORDER — CLOPIDOGREL BISULFATE 75 MG/1
75 TABLET ORAL DAILY
Status: DISCONTINUED | OUTPATIENT
Start: 2022-12-07 | End: 2022-12-07

## 2022-12-07 RX ORDER — METOPROLOL TARTRATE 25 MG/1
25 TABLET, FILM COATED ORAL 2 TIMES DAILY
Status: DISCONTINUED | OUTPATIENT
Start: 2022-12-07 | End: 2022-12-07 | Stop reason: HOSPADM

## 2022-12-07 RX ORDER — METOPROLOL TARTRATE 25 MG/1
25 TABLET, FILM COATED ORAL 2 TIMES DAILY
Qty: 60 TABLET | Refills: 11 | Status: SHIPPED | OUTPATIENT
Start: 2022-12-07 | End: 2023-12-07

## 2022-12-07 RX ADMIN — METOPROLOL TARTRATE 25 MG: 25 TABLET, FILM COATED ORAL at 11:12

## 2022-12-07 RX ADMIN — CLOPIDOGREL BISULFATE 75 MG: 75 TABLET ORAL at 11:12

## 2022-12-07 RX ADMIN — ATORVASTATIN CALCIUM 40 MG: 40 TABLET, FILM COATED ORAL at 08:12

## 2022-12-07 RX ADMIN — ASPIRIN 81 MG: 81 TABLET, COATED ORAL at 08:12

## 2022-12-07 NOTE — PROGRESS NOTES
[facility] Hospital Medicine Progress Note        Chief Complaint: Inpatient Follow-up for slurred speech    HPI:   61 y.o. male who PMH includes HTN, HLD; presented to the ED at [facility] on 12/6/2022  sent by his PCP with reports of slurred speech, blurred vision and difficulty writin which started yesterday around 1400. Pt went to his PCP and was sent to the ED for further evaluation. PT reported speech alteration yesterday and blurred vision in left eye thich started this am with associated difficulty writing which he was directed to come to the ED per his PCP. No reports of CVA in the past, denies any injuries, trauma, or falls. Labs done unremarkable. CT of head without contrast demonstrated no acute intracranial findings. Pt denies any CP, SOB, N/V/D, fever, cough, congestion, or any sick contacts.   Initial VS /104 pulse 68 respirations 18 temperature 98.4° F O2 saturation 100% on room air.  Patient is admitted to hospital medicine services for further management.    Interval Hx:  Patient feeling back at baseline this morning.  His heart rate remained controlled overnight.  Denies any chest pain or palpitations.  No shortness of breath.  No further dysarthria and his handwriting is back to normal.  His brother is at the bedside.  We discussed that his MRI was negative for acute stroke.  Likely some TIA type symptoms in the setting of atrial fibrillation.  Will follow cardiology evaluation and echocardiogram report but can likely go home later today.  Suspected he will be sent home on some type of blood thinner.    Objective/physical exam:  General: In no acute distress, afebrile  Chest: Clear to auscultation bilaterally  Heart: RRR, +S1, S2, no appreciable murmur  Abdomen: Soft, nontender, BS +  MSK: Warm, no lower extremity edema, no clubbing or cyanosis  Neurologic: Alert and oriented x4, Cranial nerve II-XII intact, Strength 5/5 in all 4 extremities    VITAL SIGNS: 24 HRS  MIN & MAX LAST   Temp  Min: 97.6 °F (36.4 °C)  Max: 98.6 °F (37 °C) 98.6 °F (37 °C)   BP  Min: 100/71  Max: 147/104 134/80   Pulse  Min: 68  Max: 91  83   Resp  Min: 15  Max: 20 18   SpO2  Min: 94 %  Max: 100 % 96 %       Recent Labs   Lab 12/06/22  1229 12/07/22  0428   WBC 9.2 9.5   RBC 4.78 4.89   HGB 14.8 15.2   HCT 45.5 46.8   MCV 95.2* 95.7*   MCH 31.0 31.1*   MCHC 32.5* 32.5*   RDW 13.4 13.3    242   MPV 9.8 9.9       Recent Labs   Lab 12/06/22  1229 12/07/22  0428    137   K 4.0 3.8   CO2 24 23   BUN 17.5 15.0   CREATININE 1.03 0.85   CALCIUM 10.4* 10.3*   MG  --  2.00   ALBUMIN 3.9 3.6   ALKPHOS 57 56   ALT 25 26   AST 25 27   BILITOT 1.1 1.0          Microbiology Results (last 7 days)       ** No results found for the last 168 hours. **             See below for Radiology    Scheduled Med:   aspirin  81 mg Oral Daily    atorvastatin  40 mg Oral Daily    enoxaparin  40 mg Subcutaneous Daily        Continuous Infusions:       PRN Meds:  metoprolol, sodium chloride 0.9%       Assessment/Plan:   Transient Ischemic Accident  New Atrial Fibrillation  Essential Hypertension  Generalized anxiety disorder- mild exacerbation  HLD- on statin therapy    MRI negative for acute stroke.  Likely represents TIA since symptoms have now resolved.    He remains rate controlled.  Will follow up Cardiology recommendations on potential anticoagulation.  Suspect that he will go home on either Eliquis or Xarelto.  He was already on a baby aspirin daily at home.    He was also on Zocor at home.  He states that his PCP recently reduced his dose due to cramping.  LDL was 88.    No evidence of diabetes.  A1c 5.5.    PT and OT to evaluate today as well as speech therapy.  Okay for cardiac diet from my standpoint.    Still waiting on echocardiogram and carotid ultrasound results.    Suspect he will go home later today after specially services round.    Patient condition:  Stable    Anticipated discharge and Disposition:  TBD      All diagnosis and differential diagnosis have been reviewed; assessment and plan has been documented; I have personally reviewed the labs and test results that are presently available; I have reviewed the patients medication list; I have reviewed the consulting providers response and recommendations. I have reviewed or attempted to review medical records based upon their availability    All of the patient's questions have been  addressed and answered. Patient's is agreeable to the above stated plan. I will continue to monitor closely and make adjustments to medical management as needed.  _____________________________________________________________________      Radiology:  MRI Brain Without Contrast  Narrative: EXAMINATION:  MRI BRAIN WITHOUT CONTRAST    CLINICAL HISTORY:  Stroke, follow up;    TECHNIQUE:  Multiplanar multisequence MR imaging of the brain was performed without contrast.    COMPARISON:  CT scan dated 12/06/2022    FINDINGS:  No intracranial mass or lesion is seen.  No hemorrhage is seen.  No acute infarct is seen.  No diffusion abnormality seen.  There is diffuse cerebral atrophy seen along with some compensatory ventricular dilatation and some subcortical white matter change consistent with patient's age.  Posterior fossa appears normal.  Calvarium is intact.  Paranasal sinuses appear grossly unremarkable.  Impression: Chronic age related changes    Otherwise unremarkable    Electronically signed by: Justo Riley  Date:    12/06/2022  Time:    18:25  CT Head Without Contrast  Narrative: EXAMINATION:  CT HEAD WITHOUT CONTRAST    CLINICAL HISTORY:  Neuro deficit, acute, stroke suspected;    TECHNIQUE:  CT imaging of the head performed from the skull base to the vertex without intravenous contrast.  mGycm. Automatic exposure control, adjustment of mA/kV or iterative reconstruction technique was used to reduce radiation.    COMPARISON:  None Available.    FINDINGS:  There is no  acute cortical infarct, hemorrhage or mass lesion.  The ventricles are normal in size.    Visualized paranasal sinuses and mastoid air cells are clear.  There are vascular calcifications.  Impression: No acute intracranial findings.    Electronically signed by: Pollo Henry  Date:    12/06/2022  Time:    12:50      Vinicius Dyer MD   12/07/2022

## 2022-12-07 NOTE — PT/OT/SLP EVAL
Occupational Therapy   Evaluation and Discharge Note    Name: Lenny Hughes  MRN: [id number]  Admitting Diagnosis: CVA r/o    Recommendations:     Discharge Recommendations: home  Discharge Equipment Recommendations: none  Barriers to discharge:  None    Assessment:     Lenny Hughes is a 61 y.o. male admitted to hospital for CVA r/o due to facial droop, L facial numbness, and difficulty writing. MRI negative for acute CVA. At this time, patient is functioning at their prior level of function and does not require further acute OT services.     Plan:     During this hospitalization, patient does not require further acute OT services.  Please re-consult if situation changes.    Plan of Care Reviewed with: patient    Subjective     Chief Complaint: none stated  Patient/Family Comments/goals: to return home    Occupational Profile:  Living Environment: Patient lives alone in a SLH with three steps to enter. Primary bathroom is a walk-in shower.   Previous level of function: Patient was independent and active with no AD.   Roles and Routines: Semi-retired  Equipment Used at home: none  Assistance upon Discharge: Self. Patient will not need assistance.     Pain/Comfort:  Pain Rating 1: 0/10    Patients cultural, spiritual, Lutheran conflicts given the current situation: no    Objective:     Communicated with: nrsg prior to session.  Patient found up in chair with telemetry, pulse ox (continuous) upon OT entry to room.    General Precautions: Standard,  (BP<220/120)  Orthopedic Precautions: N/A  Braces: N/A  Respiratory Status: Room air   BP: 143/75   HR 52      Occupational Performance:    Bed Mobility:    Patient up in chair upon OT arrival into room.     Functional Mobility/Transfers:  Patient completed Sit <> Stand Transfer with independence  with  no assistive device   Patient completed Bed <> Chair Transfer using Step Transfer technique with independence with no assistive device  Functional Mobility: Patient  ambulated 100ft independently with no AD.     Activities of Daily Living:  Feeding:  independence    Grooming: independence    Upper Body Dressing: independence    Lower Body Dressing: independence    Toileting: independence      Cognitive/Visual Perceptual:  Cognitive/Psychosocial Skills:     -       Oriented to: Person, Place, Time, and Situation   -       Follows Commands/attention:Follows multistep  commands  -       Communication: clear/fluent  Visual/Perceptual:      -Intact visual field and tracking. Patient denies any current visual changes.       Physical Exam:  Balance:    -       Normal dynamic standing balance  Sensation:    -       Intact  Dominant hand:    -       right  Upper Extremity Strength:    -       Right Upper Extremity: WNL  -       Left Upper Extremity: WNL  Fine Motor Coordination:    -       Intact        Treatment & Education:  Patient educated on OT POC, verbalized understanding.     Patient left up in chair with all lines intact    GOALS:   Multidisciplinary Problems       Occupational Therapy Goals       Not on file                    History:     No past medical history on file.    No past surgical history on file.    Time Tracking:     OT Date of Treatment: 12/07/22  OT Start Time: 1028  OT Stop Time: 1036  OT Total Time (min): 8 min    Billable Minutes:Evaluation LOW 8 min    12/7/2022

## 2022-12-07 NOTE — CONSULTS
Inpatient consult to Cardiology  Consult performed by: JEREMY Sandra  Consult ordered by: Michael Gonzales MD  Reason for consult: new onset AF    [facility] - 4th Floor Medical Telemetry  Cardiology  Consult Note    Patient Name: Lenny Hughes  MRN: [id number]  Admission Date: 12/6/2022  Hospital Length of Stay: 1 days  Code Status: Full Code   Attending Provider: Vinicius Dyer MD   Consulting Provider: JEREMY Sandra  Primary Care Physician: Ganesh Givens MD  Principal Problem:<principal problem not specified>    Patient information was obtained from patient, past medical records, and ER records.     Subjective:     Chief Complaint:  Reason for Consult: new onset AF     HPI:   Mr. Hughes is a 61 year old male who is unknown to [facility]. HE presents to the ER from his PCP with reports of slurred speech, blurred vision, & difficulty writing that began the day before. His labs were relatively negative. An EKG was obtained and demonstrated Afib CVR. He denies CP, SOB, or palps. CT head and MRI are negative for acute findings. [facility] has been consulted to further manage his new onset Afib.     PMH: HTN, HLD, anxiety   PSH: endoscopy   Family History: Denies  Social History: Denies alcohol, tobacco, or illicit drug use.     Previous Cardiac Diagnostics:   Carotid US 12.6.22:  The right internal carotid artery demonstrated less than 50% stenosis.  The left internal carotid artery demonstrated less than 50% stenosis.  The bilateral vertebral arteries were patent with antegrade flow.    Review of Systems   Respiratory:  Negative for chest tightness and shortness of breath.    Cardiovascular:  Negative for chest pain and palpitations.     Objective:     Vital Signs (Most Recent):  Temp: 98.6 °F (37 °C) (12/07/22 0707)  Pulse: 83 (12/07/22 0707)  Resp: 18 (12/07/22 0707)  BP: 134/80 (12/07/22 0707)  SpO2: 96 % (12/07/22 0707) Vital Signs (24h Range):  Temp:  [97.6 °F (36.4 °C)-98.6 °F (37 °C)] 98.6 °F  (37 °C)  Pulse:  [68-91] 83  Resp:  [15-20] 18  SpO2:  [94 %-100 %] 96 %  BP: (100-147)/() 134/80     Weight: 113.4 kg (250 lb)  Body mass index is 34.87 kg/m².    SpO2: 96 %  O2 Device (Oxygen Therapy): room air    No intake or output data in the 24 hours ending 12/07/22 0844    Lines/Drains/Airways       Peripheral Intravenous Line  Duration                  Peripheral IV - Single Lumen 12/06/22 1606 20 G Left;Anterior Antecubital <1 day                    Significant Labs:  Recent Results (from the past 72 hour(s))   Protime-INR    Collection Time: 12/06/22 12:28 PM   Result Value Ref Range    PT 13.6 12.5 - 14.5 seconds    INR 1.05 0.00 - 1.30   Comprehensive Metabolic Panel    Collection Time: 12/06/22 12:29 PM   Result Value Ref Range    Sodium Level 140 136 - 145 mmol/L    Potassium Level 4.0 3.5 - 5.1 mmol/L    Chloride 105 98 - 107 mmol/L    Carbon Dioxide 24 23 - 31 mmol/L    Glucose Level 86 82 - 115 mg/dL    Blood Urea Nitrogen 17.5 8.4 - 25.7 mg/dL    Creatinine 1.03 0.73 - 1.18 mg/dL    Calcium Level Total 10.4 (H) 8.8 - 10.0 mg/dL    Protein Total 7.6 5.8 - 7.6 gm/dL    Albumin Level 3.9 3.4 - 4.8 gm/dL    Globulin 3.7 (H) 2.4 - 3.5 gm/dL    Albumin/Globulin Ratio 1.1 1.1 - 2.0 ratio    Bilirubin Total 1.1 <=1.5 mg/dL    Alkaline Phosphatase 57 40 - 150 unit/L    Alanine Aminotransferase 25 0 - 55 unit/L    Aspartate Aminotransferase 25 5 - 34 unit/L    eGFR >60 mls/min/1.73/m2   CBC with Differential    Collection Time: 12/06/22 12:29 PM   Result Value Ref Range    WBC 9.2 4.5 - 11.5 x10(3)/mcL    RBC 4.78 4.70 - 6.10 x10(6)/mcL    Hgb 14.8 14.0 - 18.0 gm/dL    Hct 45.5 42.0 - 52.0 %    MCV 95.2 (H) 80.0 - 94.0 fL    MCH 31.0 27.0 - 31.0 pg    MCHC 32.5 (L) 33.0 - 36.0 mg/dL    RDW 13.4 11.5 - 17.0 %    Platelet 246 130 - 400 x10(3)/mcL    MPV 9.8 7.4 - 10.4 fL    Neut % 54.6 %    Lymph % 32.6 %    Mono % 10.9 %    Eos % 1.3 %    Basophil % 0.3 %    Lymph # 2.98 0.6 - 4.6 x10(3)/mcL    Neut #  5.0 2.1 - 9.2 x10(3)/mcL    Mono # 1.00 0.1 - 1.3 x10(3)/mcL    Eos # 0.12 0 - 0.9 x10(3)/mcL    Baso # 0.03 0 - 0.2 x10(3)/mcL    IG# 0.03 0 - 0.04 x10(3)/mcL    IG% 0.3 %    NRBC% 0.0 %   APTT    Collection Time: 12/06/22  1:37 PM   Result Value Ref Range    PTT 27.3 23.2 - 33.7 seconds   Brain natriuretic peptide    Collection Time: 12/06/22  1:37 PM   Result Value Ref Range    Natriuretic Peptide 48.1 <=100.0 pg/mL   Troponin I    Collection Time: 12/06/22  1:37 PM   Result Value Ref Range    Troponin-I <0.010 0.000 - 0.045 ng/mL   Lipid panel    Collection Time: 12/06/22  1:37 PM   Result Value Ref Range    Cholesterol Total 152 <=200 mg/dL    HDL Cholesterol 41 35 - 60 mg/dL    Triglyceride 114 34 - 140 mg/dL    Cholesterol/HDL Ratio 4 0 - 5    Very Low Density Lipoprotein 23     LDL Cholesterol 88.00 50.00 - 140.00 mg/dL   TSH    Collection Time: 12/06/22  1:37 PM   Result Value Ref Range    Thyroid Stimulating Hormone 1.3986 0.3500 - 4.9400 uIU/mL   Urinalysis, Reflex to Urine Culture Urine, Clean Catch    Collection Time: 12/06/22  1:49 PM    Specimen: Urine   Result Value Ref Range    Color, UA Yellow Yellow, Light-Yellow, Dark Yellow, Ilene, Straw    Appearance, UA Clear Clear    Specific Gravity, UA 1.016 1.001 - 1.030    pH, UA 6.0 5.0 - 8.5    Protein, UA Negative Negative mg/dL    Glucose, UA Negative Negative, Normal mg/dL    Ketones, UA Negative Negative mg/dL    Blood, UA Negative Negative unit/L    Bilirubin, UA Negative Negative mg/dL    Urobilinogen, UA 1.0 0.2, 1.0, Normal mg/dL    Nitrites, UA Negative Negative    Leukocyte Esterase, UA 1+ (A) Negative unit/L   Urinalysis, Microscopic    Collection Time: 12/06/22  1:49 PM   Result Value Ref Range    RBC, UA <5 <=5 /HPF    WBC, UA 7 (H) <=5 /HPF    Squamous Epithelial Cells, UA <5 <=5 /HPF    Bacteria, UA None Seen None Seen, Rare, Occasional /HPF   CV Ultrasound Bilateral Doppler Carotid    Collection Time: 12/06/22  5:50 PM   Result Value Ref  Range    Left ICA/CCA ratio 1.46     Right ICA/CCA ratio 0.92     Left Highest ICA 92.00     Left Highest CCA 63     Right Highest ICA 69.00     Right Highest CCA 75     Right Highest EDV 20.00     LT Highest EDV 33.00     Right CCA prox sys 73 cm/s    Right CCA prox bergman 16 cm/s    Right CCA dist sys 75 cm/s    Right CCA dist bergman 16 cm/s    Right ICA prox sys 55 cm/s    Right ICA prox bergman 14 cm/s    Right ICA mid sys 69 cm/s    Right ICA mid bergman 0 cm/s    Right ICA dist sys 56 cm/s    Right ICA dist bergman 20 cm/s    Right ECA sys 83 cm/s    Right vertebral sys 44 cm/s    Left CCA prox sys 51 cm/s    Left CCA prox bergman 10 cm/s    Left CCA dist sys 63 cm/s    Left CCA dist bergman 13 cm/s    Left ICA prox sys 75 cm/s    Left ICA prox bergman 19 cm/s    Left ICA mid sys 81 cm/s    Left ICA mid bergman 21 cm/s    Left ICA dist sys 92 cm/s    Left ICA dist bergman 33 cm/s    Left ECA sys 103 cm/s    Left vertebral sys 28 cm/s    Right vertebral bergman 0 cm/s    Right ECA bergman 0 cm/s   Comprehensive metabolic panel    Collection Time: 12/07/22  4:28 AM   Result Value Ref Range    Sodium Level 137 136 - 145 mmol/L    Potassium Level 3.8 3.5 - 5.1 mmol/L    Chloride 105 98 - 107 mmol/L    Carbon Dioxide 23 23 - 31 mmol/L    Glucose Level 82 82 - 115 mg/dL    Blood Urea Nitrogen 15.0 8.4 - 25.7 mg/dL    Creatinine 0.85 0.73 - 1.18 mg/dL    Calcium Level Total 10.3 (H) 8.8 - 10.0 mg/dL    Protein Total 7.5 5.8 - 7.6 gm/dL    Albumin Level 3.6 3.4 - 4.8 gm/dL    Globulin 3.9 (H) 2.4 - 3.5 gm/dL    Albumin/Globulin Ratio 0.9 (L) 1.1 - 2.0 ratio    Bilirubin Total 1.0 <=1.5 mg/dL    Alkaline Phosphatase 56 40 - 150 unit/L    Alanine Aminotransferase 26 0 - 55 unit/L    Aspartate Aminotransferase 27 5 - 34 unit/L    eGFR >60 mls/min/1.73/m2   Magnesium    Collection Time: 12/07/22  4:28 AM   Result Value Ref Range    Magnesium Level 2.00 1.60 - 2.60 mg/dL   Phosphorus    Collection Time: 12/07/22  4:28 AM   Result Value Ref Range     Phosphorus Level 2.4 2.3 - 4.7 mg/dL   Troponin I    Collection Time: 12/07/22  4:28 AM   Result Value Ref Range    Troponin-I <0.010 0.000 - 0.045 ng/mL   CK-MB    Collection Time: 12/07/22  4:28 AM   Result Value Ref Range    Creatine Kinase MB 1.1 <=7.2 ng/mL   APTT    Collection Time: 12/07/22  4:28 AM   Result Value Ref Range    PTT 30.1 23.2 - 33.7 seconds   Protime-INR    Collection Time: 12/07/22  4:28 AM   Result Value Ref Range    PT 13.8 12.5 - 14.5 seconds    INR 1.07 0.00 - 1.30   Hemoglobin A1C    Collection Time: 12/07/22  4:28 AM   Result Value Ref Range    Hemoglobin A1c 5.5 <=7.0 %    Estimated Average Glucose 111.2 mg/dL   CBC with Differential    Collection Time: 12/07/22  4:28 AM   Result Value Ref Range    WBC 9.5 4.5 - 11.5 x10(3)/mcL    RBC 4.89 4.70 - 6.10 x10(6)/mcL    Hgb 15.2 14.0 - 18.0 gm/dL    Hct 46.8 42.0 - 52.0 %    MCV 95.7 (H) 80.0 - 94.0 fL    MCH 31.1 (H) 27.0 - 31.0 pg    MCHC 32.5 (L) 33.0 - 36.0 mg/dL    RDW 13.3 11.5 - 17.0 %    Platelet 242 130 - 400 x10(3)/mcL    MPV 9.9 7.4 - 10.4 fL    Neut % 54.0 %    Lymph % 32.5 %    Mono % 11.1 %    Eos % 1.8 %    Basophil % 0.4 %    Lymph # 3.09 0.6 - 4.6 x10(3)/mcL    Neut # 5.1 2.1 - 9.2 x10(3)/mcL    Mono # 1.05 0.1 - 1.3 x10(3)/mcL    Eos # 0.17 0 - 0.9 x10(3)/mcL    Baso # 0.04 0 - 0.2 x10(3)/mcL    IG# 0.02 0 - 0.04 x10(3)/mcL    IG% 0.2 %    NRBC% 0.0 %       Significant Imaging:  Imaging Results              MRI Brain Without Contrast (Final result)  Result time 12/06/22 18:25:36      Final result by Justo Riley MD (12/06/22 18:25:36)                   Impression:      Chronic age related changes    Otherwise unremarkable      Electronically signed by: Justo Riley  Date:    12/06/2022  Time:    18:25               Narrative:    EXAMINATION:  MRI BRAIN WITHOUT CONTRAST    CLINICAL HISTORY:  Stroke, follow up;    TECHNIQUE:  Multiplanar multisequence MR imaging of the brain was performed without  contrast.    COMPARISON:  CT scan dated 12/06/2022    FINDINGS:  No intracranial mass or lesion is seen.  No hemorrhage is seen.  No acute infarct is seen.  No diffusion abnormality seen.  There is diffuse cerebral atrophy seen along with some compensatory ventricular dilatation and some subcortical white matter change consistent with patient's age.  Posterior fossa appears normal.  Calvarium is intact.  Paranasal sinuses appear grossly unremarkable.                                       CT Head Without Contrast (Final result)  Result time 12/06/22 12:50:20      Final result by Pollo Henry MD (12/06/22 12:50:20)                   Impression:      No acute intracranial findings.      Electronically signed by: Pollo Henry  Date:    12/06/2022  Time:    12:50               Narrative:    EXAMINATION:  CT HEAD WITHOUT CONTRAST    CLINICAL HISTORY:  Neuro deficit, acute, stroke suspected;    TECHNIQUE:  CT imaging of the head performed from the skull base to the vertex without intravenous contrast.  mGycm. Automatic exposure control, adjustment of mA/kV or iterative reconstruction technique was used to reduce radiation.    COMPARISON:  None Available.    FINDINGS:  There is no acute cortical infarct, hemorrhage or mass lesion.  The ventricles are normal in size.    Visualized paranasal sinuses and mastoid air cells are clear.  There are vascular calcifications.                                      EKG:        Telemetry:  Afib CVR 80's    Physical Exam  HENT:      Head: Normocephalic.      Nose: Nose normal.      Mouth/Throat:      Mouth: Mucous membranes are moist.   Eyes:      Extraocular Movements: Extraocular movements intact.   Cardiovascular:      Rate and Rhythm: Normal rate. Rhythm irregular.      Pulses: Normal pulses.      Heart sounds: Normal heart sounds.   Pulmonary:      Effort: Pulmonary effort is normal.      Breath sounds: Normal breath sounds.   Abdominal:      Palpations: Abdomen is soft.    Musculoskeletal:         General: Normal range of motion.   Skin:     General: Skin is warm and dry.   Neurological:      General: No focal deficit present.      Mental Status: He is alert and oriented to person, place, and time.   Psychiatric:         Behavior: Behavior normal.       Home Medications:   No current facility-administered medications on file prior to encounter.     Current Outpatient Medications on File Prior to Encounter   Medication Sig Dispense Refill    amLODIPine (NORVASC) 10 MG tablet Take 10 mg by mouth once daily.      hydroCHLOROthiazide (HYDRODIURIL) 25 MG tablet Take 25 mg by mouth every morning.      simvastatin (ZOCOR) 40 MG tablet Take 40 mg by mouth every evening.         Current Inpatient Medications:    Current Facility-Administered Medications:     aspirin EC tablet 81 mg, 81 mg, Oral, Daily, JEREMY Gutierrez    atorvastatin tablet 40 mg, 40 mg, Oral, Daily, JEREMY Gutierrez    enoxaparin injection 40 mg, 40 mg, Subcutaneous, Daily, JEREMY Gutierrez, 40 mg at 12/06/22 1610    metoprolol injection 5 mg, 5 mg, Intravenous, Q6H PRN, JEREMY Gutierrez    sodium chloride 0.9% flush 10 mL, 10 mL, Intravenous, PRN, JEERMY Gutierrez         VTE Risk Mitigation (From admission, onward)           Ordered     enoxaparin injection 40 mg  Daily         12/06/22 1514     IP VTE HIGH RISK PATIENT  Once         12/06/22 1514     Place sequential compression device  Until discontinued         12/06/22 1514                    Assessment:   New Onset Afib RVR - CVR    - CHADSVASC Score 3 Points  TIA    - symptoms have resolved    - CT head & MRI negative   HTN  Anxiety  HLD    Plan:   Echo pending.   Start metoprolol 25 mg BID.   Recommend starting Eliquis 5 mg BID for CVA prophylaxis in the setting of PAF if okay with neuro.     Thank you for your consult.     JEREMY Sandra  Cardiology  [facility] - 4th Floor [facility]  Telemetry  12/07/2022 8:44 AM

## 2022-12-07 NOTE — DISCHARGE SUMMARY
[facility] Medicine Discharge Summary    Admit Date: 12/6/2022  Discharge Date and Time: 12/7/20222:57 PM  Admitting Physician: Hospitalist team   Discharging Physician: Vinicius Dyer MD.  Primary Care Physician: Ganesh Givens MD      Discharge Diagnoses:  Transient Ischemic Accident  New Atrial Fibrillation  Essential Hypertension  Generalized anxiety disorder- mild exacerbation  HLD- on statin therapy    Hospital Course:   61 y.o. male who PMH includes HTN, HLD; presented to the ED at [facility] on 12/6/2022  sent by his PCP with reports of slurred speech, blurred vision and difficulty writin which started yesterday around 1400. Pt went to his PCP and was sent to the ED for further evaluation. PT reported speech alteration yesterday and blurred vision in left eye thich started this am with associated difficulty writing which he was directed to come to the ED per his PCP. No reports of CVA in the past, denies any injuries, trauma, or falls. Labs done unremarkable. CT of head without contrast demonstrated no acute intracranial findings. Pt denies any CP, SOB, N/V/D, fever, cough, congestion, or any sick contacts.   Initial VS /104 pulse 68 respirations 18 temperature 98.4° F O2 saturation 100% on room air.  Patient is admitted to hospital medicine services for further management.  Patient was evaluated by Cardiology.  Recommending continued rate control with beta-blocker and started on Eliquis.  MRI of the brain was negative for any acute signs of ischemia.  Does have some chronic microvascular changes which is expected age-related changes.  Neurology recommending follow-up in the stroke Clinic for further risk factor modification.  Carotid ultrasound showed less than 50% stenosis an echocardiogram had an EF of 75% with normal valvular structures and slightly enlarged right atrium.  Discussed discharge plan with the patient and his son over the phone.  He has no residual  "deficits.  Okay to return home today with close clinical follow-up    Vitals:  Blood pressure 120/81, pulse 85, temperature 97.9 °F (36.6 °C), temperature source Oral, resp. rate 18, height 5' 11" (1.803 m), weight 113.4 kg (250 lb), SpO2 97 %..    Physical Exam:  Awake, Alert, Oriented x 3, No new focal Neurologic deficit, Normal Affect  NC/AT, PERRLA, EOMI  Supple neck, no JVD, No cervical lymphadenopathy  Symmetrical chest, Good air entry bilaterally. No rhonchi, wheezes, crackles appreciated  RRR, No gallop, rub or murmur  +ve Bowel sounds x4, Abd soft and non tender, no rebound, guarding or rigidity  No Cyanosis, cludding or edema, No new rash or bruises    Procedures Performed: No admission procedures for hospital encounter.     Significant Diagnostic Studies: See Full reports for all details  No results displayed because visit has over 200 results.           Microbiology Results (last 7 days)       ** No results found for the last 168 hours. **             CT Head Without Contrast    Result Date: 12/6/2022  EXAMINATION: CT HEAD WITHOUT CONTRAST CLINICAL HISTORY: Neuro deficit, acute, stroke suspected; TECHNIQUE: CT imaging of the head performed from the skull base to the vertex without intravenous contrast.  mGycm. Automatic exposure control, adjustment of mA/kV or iterative reconstruction technique was used to reduce radiation. COMPARISON: None Available. FINDINGS: There is no acute cortical infarct, hemorrhage or mass lesion.  The ventricles are normal in size. Visualized paranasal sinuses and mastoid air cells are clear.  There are vascular calcifications.     No acute intracranial findings. Electronically signed by: Pollo Henry Date:    12/06/2022 Time:    12:50    MRI Brain Without Contrast    Result Date: 12/6/2022  EXAMINATION: MRI BRAIN WITHOUT CONTRAST CLINICAL HISTORY: Stroke, follow up; TECHNIQUE: Multiplanar multisequence MR imaging of the brain was performed without contrast. COMPARISON: " CT scan dated 12/06/2022 FINDINGS: No intracranial mass or lesion is seen.  No hemorrhage is seen.  No acute infarct is seen.  No diffusion abnormality seen.  There is diffuse cerebral atrophy seen along with some compensatory ventricular dilatation and some subcortical white matter change consistent with patient's age.  Posterior fossa appears normal.  Calvarium is intact.  Paranasal sinuses appear grossly unremarkable.     Chronic age related changes Otherwise unremarkable Electronically signed by: Justo Riley Date:    12/06/2022 Time:    18:25    CV Ultrasound Bilateral Doppler Carotid    Result Date: 12/7/2022  The right internal carotid artery demonstrated less than 50% stenosis. The left internal carotid artery demonstrated less than 50% stenosis. The bilateral vertebral arteries were patent with antegrade flow.   - pulls last radiology orders        Medication List        START taking these medications      apixaban 5 mg Tab  Commonly known as: ELIQUIS  Take 1 tablet (5 mg total) by mouth 2 (two) times daily.     atorvastatin 40 MG tablet  Commonly known as: LIPITOR  Take 1 tablet (40 mg total) by mouth once daily.  Start taking on: December 8, 2022     metoprolol tartrate 25 MG tablet  Commonly known as: LOPRESSOR  Take 1 tablet (25 mg total) by mouth 2 (two) times daily.            CONTINUE taking these medications      amLODIPine 10 MG tablet  Commonly known as: NORVASC     hydroCHLOROthiazide 25 MG tablet  Commonly known as: HYDRODIURIL            STOP taking these medications      simvastatin 40 MG tablet  Commonly known as: ZOCOR               Where to Get Your Medications        These medications were sent to [address] [address]      Phone: [phone]   apixaban 5 mg Tab  atorvastatin 40 MG tablet  metoprolol tartrate 25 MG tablet          Explained in detail to the patient about the discharge plan, medications, and  follow-up visits. Pt understands and agrees with the treatment plan  Discharged Condition: stable  Diet: cardiac  Disposition: home    Medications Per DC med rec  Activities as tolerated  Follow up with your PCP in 2 wks   For further questions contact hospitalist office    Discharge time 33 minutes    For worsening symptoms, chest pain, shortness of breath, increased abdominal pain, high grade fever, stroke or stroke like symptoms, immediately go to the nearest Emergency Room or call 911 as soon as possible.      Vinicius Lees M.D, on 12/7/2022. at 2:57 PM.

## 2022-12-07 NOTE — PLAN OF CARE
12/07/22 1505   Discharge Assessment   Assessment Type Discharge Planning Assessment   Communicated RADHA with patient/caregiver Yes   Reason For Admission Blurred vision   People in Home alone   Do you expect to return to your current living situation? Yes   Prior to hospitilization cognitive status: Alert/Oriented   Current cognitive status: Alert/Oriented   Equipment Currently Used at Home none   Do you currently have service(s) that help you manage your care at home? No   Do you take prescription medications? Yes   Do you have prescription coverage? Yes   Coverage BCBS   Do you have any problems affording any of your prescribed medications? No   Is the patient taking medications as prescribed? yes   Who is going to help you get home at discharge? Will drive self   Are you on dialysis? No   Do you take coumadin? No   Discharge Plan A Home   Discharge Plan B Home   Discharge Plan discussed with: Patient   Discharge Barriers Identified None

## 2022-12-07 NOTE — NURSING
Nurses Note -- 4 Eyes      12/7/2022   11:37 AM      Skin assessed during: Admit      [x] No Pressure Injuries Present    []Prevention Measures Documented      [] Yes- Altered Skin Integrity Present or Discovered   [] LDA Added if Not in Epic (Describe Wound)   [] New Altered Skin Integrity was Present on Admit and Documented in LDA   [] Wound Image Taken    Wound Care Consulted? No    Attending Nurse:  Cyndy Tang RN     Second RN/Staff Member: Perla LEVY

## 2022-12-07 NOTE — PT/OT/SLP EVAL
"Speech Language Pathology Department  Cognitive-Communication Evaluation    Patient Name:  Lenny Hughes   MRN:  [id number]  Admitting Diagnosis: <principal problem not specified>    Recommendations:     General recommendations:  SLP intervention not indicated  Communication strategies:  none    History:     No past medical history on file.    No past surgical history on file.    Previous level of Function  Education:high school  Occupation: unemployed  Lives: alone  Handed: Right  Glasses: yes  Hearing Aids: no    Subjective     Patient awake and alert.    Patient goals: "to go home"     Spiritual/Cultural/Yarsanism Beliefs/Practices that affect care:  no  Pain/Comfort:  no    Objective:     SPEECH PRODUCTION  Phoneme Production: wfl  Voice Quality: wfl  Voice Production: wfl  Speech Rate: wfl  Loudness: wfl  Speech Intelligibility  Known Context: Greater that 90%  Unknown Context: Greater that 90%    AUDITORY COMPREHENSION  Identification:  Body parts: 100%  Objects: 100%  Following Directions:  1-Step: 100%  2-Step: 100%  Yes/No Questions:  Biographical: 100%  Environmental: 100%  Simple: 100%  Complex: 100%    VERBAL EXPRESSION  Automatic Speech:  Functional needs: 100%  Days of the week: 100%  Months of the year: 100%  Phrase Completion: 100%  Confrontation Naming  Body Parts: 100%  Objects: 100%  Wh- Questions:  Object name: 100%  Object function: 100%    COGNITION  Orientation:  Person: 100%  Place: 100%  Time: 100%  Situation: 100%  Attention:  Focused: .100%  Sustained: 100%  Memory:  Immediate: 100%  Delayed: 100%  Problem Solving  Functional simple: 100%  Functional complex: 100%  Organization:  Convergent thinkin%  Divergent thinkin%  Assessment:     Pt presents with functional speech and language skills, cognitive linguistic skills note to be at baseline. Skilled SLP intervention is warranted at this time.     Goals:   Multidisciplinary Problems       SLP Goals       Not on file              "     Patient Education:     Patient provided with verbal education regarding POC.  Understanding was verbalized.    Plan:     Patient to be seen:      Plan of Care expires:     Plan of Care reviewed with:      SLP Follow-Up:          Time Tracking:     SLP Treatment Date:    12/7/22  Speech Start Time:   0915  Speech Stop Time:      0930  Speech Total Time (min):   15    Billable minutes:  Evaluation of Speech Sound Production with Comprehension and Expression, 15      12/07/2022

## 2022-12-07 NOTE — PT/OT/SLP EVAL
Physical Therapy Evaluation and Discharge Note    Patient Name:  Lenny Hughes   MRN:  [id number]    Recommendations:     Discharge Recommendations: home  Discharge Equipment Recommendations: none   Barriers to discharge: None    Assessment:     Lenny Hughes is a 61 y.o. male admitted with a medical diagnosis of <principal problem not specified>. .  At this time, patient is functioning at their prior level of function and does not require further acute PT services.     Recent Surgery: * No surgery found *      Plan:     During this hospitalization, patient does not require further acute PT services.  Please re-consult if situation changes.      Subjective     Chief Complaint: none  Patient/Family Comments/goals: none  Pain/Comfort:  Pain Rating 1: 0/10    Patients cultural, spiritual, Episcopalian conflicts given the current situation: no    Living Environment:  Lives alone in mobile home with 4 steps (bilateral rails) to enter.   Prior to admission, patients level of function was independent.  Equipment used at home: none.  DME owned (not currently used): none.  Upon discharge, patient will have assistance from no one.    Objective:     Communicated with nurse prior to session.  Patient found  sitting on couch  with   upon PT entry to room.    General Precautions: Standard,  (BP<220/120)    Orthopedic Precautions:N/A   Braces: N/A  Respiratory Status: Room air  BP: 126/93    Exams:  Cognitive Exam:  Patient is oriented to Person, Place, Time, and Situation  Sensation:    -       Intact  RLE ROM: WNL  RLE Strength: WNL  LLE ROM: WNL  LLE Strength: WNL    Functional Mobility:  Transfers:     Sit to Stand:  independence with no AD  Gait: 150 ft independently  Balance: good    AM-PAC 6 CLICK MOBILITY  Total Score:24       AM-PAC 6 CLICK MOBILITY  Total Score:24     Patient left sitting edge of bed with all lines intact and call button in reach.    GOALS:   Multidisciplinary Problems       Physical Therapy Goals        Not on file                    History:     No past medical history on file.    No past surgical history on file.    Time Tracking:     PT Received On: 12/07/22  PT Start Time: 0735     PT Stop Time: 0745  PT Total Time (min): 10 min     Billable Minutes: Evaluation 10 minutes      12/07/2022

## 2022-12-08 LAB
AORTIC ROOT ANNULUS: 2.7 CM
AV INDEX (PROSTH): 0.72
AV MEAN GRADIENT: 3 MMHG
AV PEAK GRADIENT: 5 MMHG
AV VALVE AREA: 2.98 CM2
AV VELOCITY RATIO: 0.65
BSA FOR ECHO PROCEDURE: 2.38 M2
CV ECHO LV RWT: 0.61 CM
DOP CALC AO PEAK VEL: 1.1 M/S
DOP CALC AO VTI: 18.7 CM
DOP CALC LVOT AREA: 4.2 CM2
DOP CALC LVOT DIAMETER: 2.3 CM
DOP CALC LVOT PEAK VEL: 0.71 M/S
DOP CALC LVOT STROKE VOLUME: 55.65 CM3
DOP CALC MV VTI: 27.6 CM
DOP CALCLVOT PEAK VEL VTI: 13.4 CM
E WAVE DECELERATION TIME: 444 MSEC
E/A RATIO: 4.22
E/E' RATIO: 6.93 M/S
ECHO LV POSTERIOR WALL: 1.52 CM (ref 0.6–1.1)
EJECTION FRACTION: 65 %
FRACTIONAL SHORTENING: 45 % (ref 28–44)
INTERVENTRICULAR SEPTUM: 1.5 CM (ref 0.6–1.1)
LEFT ATRIUM SIZE: 4.7 CM
LEFT ATRIUM VOLUME INDEX MOD: 25.5 ML/M2
LEFT ATRIUM VOLUME MOD: 59.1 CM3
LEFT INTERNAL DIMENSION IN SYSTOLE: 2.74 CM (ref 2.1–4)
LEFT VENTRICLE DIASTOLIC VOLUME INDEX: 51.29 ML/M2
LEFT VENTRICLE DIASTOLIC VOLUME: 119 ML
LEFT VENTRICLE MASS INDEX: 141 G/M2
LEFT VENTRICLE SYSTOLIC VOLUME INDEX: 12.1 ML/M2
LEFT VENTRICLE SYSTOLIC VOLUME: 28 ML
LEFT VENTRICULAR INTERNAL DIMENSION IN DIASTOLE: 5.02 CM (ref 3.5–6)
LEFT VENTRICULAR MASS: 327.75 G
LV LATERAL E/E' RATIO: 5.71 M/S
LV SEPTAL E/E' RATIO: 8.82 M/S
LVOT MG: 1 MMHG
LVOT MV: 0.44 CM/S
MV MEAN GRADIENT: 1 MMHG
MV PEAK A VEL: 0.23 M/S
MV PEAK E VEL: 0.97 M/S
MV PEAK GRADIENT: 4 MMHG
MV VALVE AREA BY CONTINUITY EQUATION: 2.02 CM2
PISA TR MAX VEL: 2.78 M/S
PV PEAK VELOCITY: 0.9 CM/S
RA PRESSURE: 8 MMHG
RIGHT VENTRICULAR END-DIASTOLIC DIMENSION: 3.25 CM
TDI LATERAL: 0.17 M/S
TDI SEPTAL: 0.11 M/S
TDI: 0.14 M/S
TR MAX PG: 31 MMHG
TRICUSPID ANNULAR PLANE SYSTOLIC EXCURSION: 2.25 CM
TV REST PULMONARY ARTERY PRESSURE: 39 MMHG

## 2022-12-12 ENCOUNTER — OFFICE VISIT (OUTPATIENT)
Dept: NEUROLOGY | Facility: CLINIC | Age: 62
End: 2022-12-12
Payer: COMMERCIAL

## 2022-12-12 VITALS
DIASTOLIC BLOOD PRESSURE: 88 MMHG | BODY MASS INDEX: 35 KG/M2 | WEIGHT: 250 LBS | SYSTOLIC BLOOD PRESSURE: 154 MMHG | HEIGHT: 71 IN

## 2022-12-12 DIAGNOSIS — G45.9 TIA (TRANSIENT ISCHEMIC ATTACK): Primary | ICD-10-CM

## 2022-12-12 DIAGNOSIS — I63.89 OTHER CEREBRAL INFARCTION: Primary | ICD-10-CM

## 2022-12-12 DIAGNOSIS — I48.0 PAF (PAROXYSMAL ATRIAL FIBRILLATION): ICD-10-CM

## 2022-12-12 PROCEDURE — 3077F SYST BP >= 140 MM HG: CPT | Mod: CPTII,S$GLB,, | Performed by: PSYCHIATRY & NEUROLOGY

## 2022-12-12 PROCEDURE — 1159F PR MEDICATION LIST DOCUMENTED IN MEDICAL RECORD: ICD-10-PCS | Mod: CPTII,S$GLB,, | Performed by: PSYCHIATRY & NEUROLOGY

## 2022-12-12 PROCEDURE — 1159F MED LIST DOCD IN RCRD: CPT | Mod: CPTII,S$GLB,, | Performed by: PSYCHIATRY & NEUROLOGY

## 2022-12-12 PROCEDURE — 3079F PR MOST RECENT DIASTOLIC BLOOD PRESSURE 80-89 MM HG: ICD-10-PCS | Mod: CPTII,S$GLB,, | Performed by: PSYCHIATRY & NEUROLOGY

## 2022-12-12 PROCEDURE — 99214 PR OFFICE/OUTPT VISIT, EST, LEVL IV, 30-39 MIN: ICD-10-PCS | Mod: S$GLB,,, | Performed by: PSYCHIATRY & NEUROLOGY

## 2022-12-12 PROCEDURE — 3008F BODY MASS INDEX DOCD: CPT | Mod: CPTII,S$GLB,, | Performed by: PSYCHIATRY & NEUROLOGY

## 2022-12-12 PROCEDURE — 3008F PR BODY MASS INDEX (BMI) DOCUMENTED: ICD-10-PCS | Mod: CPTII,S$GLB,, | Performed by: PSYCHIATRY & NEUROLOGY

## 2022-12-12 PROCEDURE — 99999 PR PBB SHADOW E&M-EST. PATIENT-LVL III: CPT | Mod: PBBFAC,,, | Performed by: PSYCHIATRY & NEUROLOGY

## 2022-12-12 PROCEDURE — 3077F PR MOST RECENT SYSTOLIC BLOOD PRESSURE >= 140 MM HG: ICD-10-PCS | Mod: CPTII,S$GLB,, | Performed by: PSYCHIATRY & NEUROLOGY

## 2022-12-12 PROCEDURE — 99214 OFFICE O/P EST MOD 30 MIN: CPT | Mod: S$GLB,,, | Performed by: PSYCHIATRY & NEUROLOGY

## 2022-12-12 PROCEDURE — 3079F DIAST BP 80-89 MM HG: CPT | Mod: CPTII,S$GLB,, | Performed by: PSYCHIATRY & NEUROLOGY

## 2022-12-12 PROCEDURE — 99999 PR PBB SHADOW E&M-EST. PATIENT-LVL III: ICD-10-PCS | Mod: PBBFAC,,, | Performed by: PSYCHIATRY & NEUROLOGY

## 2022-12-12 NOTE — PROGRESS NOTES
Neurology Office Visit  Neurology    Lenny Hughes is a 61 y.o. male for hosp f/u.  Presented to [facility] with acute RLE/LLE numbness, RUE clumsiness.  MRI brain neg for acute stroke.  Reports no further symptoms.  On Eliquis for PAF.    ROS:  Review of Systems   All other systems reviewed and are negative.     Past Medical History:   Diagnosis Date    Hyperlipidemia     Hypertension     Stroke      Past Surgical History:   Procedure Laterality Date    BACK SURGERY       Family History   Problem Relation Age of Onset    Atrial fibrillation Mother     Crohn's disease Mother     Heart disease Father     Heart disease Brother      Review of patient's allergies indicates:  No Known Allergies    Current Outpatient Medications:     amLODIPine (NORVASC) 10 MG tablet, Take 10 mg by mouth once daily., Disp: , Rfl:     apixaban (ELIQUIS) 5 mg Tab, Take 1 tablet (5 mg total) by mouth 2 (two) times daily., Disp: 60 tablet, Rfl: 0    atorvastatin (LIPITOR) 40 MG tablet, Take 1 tablet (40 mg total) by mouth once daily., Disp: 90 tablet, Rfl: 3    hydroCHLOROthiazide (HYDRODIURIL) 25 MG tablet, Take 25 mg by mouth every morning., Disp: , Rfl:     metoprolol tartrate (LOPRESSOR) 25 MG tablet, Take 1 tablet (25 mg total) by mouth 2 (two) times daily., Disp: 60 tablet, Rfl: 11  Outpatient Medications Marked as Taking for the 12/12/22 encounter (Office Visit) with Darell Mercado MD   Medication Sig Dispense Refill    amLODIPine (NORVASC) 10 MG tablet Take 10 mg by mouth once daily.      apixaban (ELIQUIS) 5 mg Tab Take 1 tablet (5 mg total) by mouth 2 (two) times daily. 60 tablet 0    atorvastatin (LIPITOR) 40 MG tablet Take 1 tablet (40 mg total) by mouth once daily. 90 tablet 3    hydroCHLOROthiazide (HYDRODIURIL) 25 MG tablet Take 25 mg by mouth every morning.      metoprolol tartrate (LOPRESSOR) 25 MG tablet Take 1 tablet (25 mg total) by mouth 2 (two) times daily. 60 tablet 11     Social History     Tobacco Use    Smoking  status: Former     Types: Cigarettes     Quit date: 2019     Years since quitting: 3.9    Smokeless tobacco: Never   Substance Use Topics    Alcohol use: Yes     Alcohol/week: 10.0 standard drinks     Types: 10 Cans of beer per week     Comment: daily    Drug use: Never         Vitals:    12/12/22 1520   BP: (!) 154/88     Gen NAD  HEENT NC/AT  CV RRR, no carotid bruits  Resp clear  GI soft  Ext no C/C/E  Neuro  AAOx4  Speech fluent, appropriate  EOMI, PERRLA, VFF, no papilledema  Normal facial strength, sensation  Tongue and palate midline  Motor 5/5  Sensation intact  DTRs symmetric  Coord intact  Gait Normal    Assessment: TIA  PAF    Plan: Order CTA head and ncek  Continue Eliquis

## 2022-12-28 ENCOUNTER — HOSPITAL ENCOUNTER (OUTPATIENT)
Dept: RADIOLOGY | Facility: HOSPITAL | Age: 62
Discharge: HOME | End: 2022-12-28
Attending: PSYCHIATRY & NEUROLOGY
Payer: COMMERCIAL

## 2022-12-28 DIAGNOSIS — I63.89 OTHER CEREBRAL INFARCTION: ICD-10-CM

## 2022-12-28 PROCEDURE — 70496 CT ANGIOGRAPHY HEAD: CPT | Mod: TC

## 2022-12-28 PROCEDURE — 25500020 PHARM REV CODE 255: Performed by: PSYCHIATRY & NEUROLOGY

## 2022-12-28 PROCEDURE — 70498 CT ANGIOGRAPHY NECK: CPT | Mod: TC

## 2022-12-28 RX ADMIN — IOPAMIDOL 100 ML: 755 INJECTION, SOLUTION INTRAVENOUS at 12:12

## 2023-02-01 ENCOUNTER — OFFICE VISIT (OUTPATIENT)
Dept: NEUROLOGY | Facility: CLINIC | Age: 63
End: 2023-02-01
Payer: COMMERCIAL

## 2023-02-01 VITALS
HEART RATE: 66 BPM | BODY MASS INDEX: 34.86 KG/M2 | DIASTOLIC BLOOD PRESSURE: 81 MMHG | WEIGHT: 249 LBS | HEIGHT: 71 IN | SYSTOLIC BLOOD PRESSURE: 125 MMHG

## 2023-02-01 DIAGNOSIS — G45.9 TRANSIENT ISCHEMIC ATTACK (TIA): Primary | ICD-10-CM

## 2023-02-01 DIAGNOSIS — I48.0 PAROXYSMAL ATRIAL FIBRILLATION: ICD-10-CM

## 2023-02-01 PROCEDURE — 99213 OFFICE O/P EST LOW 20 MIN: CPT | Mod: S$GLB,,, | Performed by: NURSE PRACTITIONER

## 2023-02-01 PROCEDURE — 1159F PR MEDICATION LIST DOCUMENTED IN MEDICAL RECORD: ICD-10-PCS | Mod: CPTII,S$GLB,, | Performed by: NURSE PRACTITIONER

## 2023-02-01 PROCEDURE — 1160F PR REVIEW ALL MEDS BY PRESCRIBER/CLIN PHARMACIST DOCUMENTED: ICD-10-PCS | Mod: CPTII,S$GLB,, | Performed by: NURSE PRACTITIONER

## 2023-02-01 PROCEDURE — 99213 PR OFFICE/OUTPT VISIT, EST, LEVL III, 20-29 MIN: ICD-10-PCS | Mod: S$GLB,,, | Performed by: NURSE PRACTITIONER

## 2023-02-01 PROCEDURE — 3074F PR MOST RECENT SYSTOLIC BLOOD PRESSURE < 130 MM HG: ICD-10-PCS | Mod: CPTII,S$GLB,, | Performed by: NURSE PRACTITIONER

## 2023-02-01 PROCEDURE — 1160F RVW MEDS BY RX/DR IN RCRD: CPT | Mod: CPTII,S$GLB,, | Performed by: NURSE PRACTITIONER

## 2023-02-01 PROCEDURE — 99999 PR PBB SHADOW E&M-EST. PATIENT-LVL III: ICD-10-PCS | Mod: PBBFAC,,, | Performed by: NURSE PRACTITIONER

## 2023-02-01 PROCEDURE — 99999 PR PBB SHADOW E&M-EST. PATIENT-LVL III: CPT | Mod: PBBFAC,,, | Performed by: NURSE PRACTITIONER

## 2023-02-01 PROCEDURE — 3008F PR BODY MASS INDEX (BMI) DOCUMENTED: ICD-10-PCS | Mod: CPTII,S$GLB,, | Performed by: NURSE PRACTITIONER

## 2023-02-01 PROCEDURE — 3079F DIAST BP 80-89 MM HG: CPT | Mod: CPTII,S$GLB,, | Performed by: NURSE PRACTITIONER

## 2023-02-01 PROCEDURE — 1159F MED LIST DOCD IN RCRD: CPT | Mod: CPTII,S$GLB,, | Performed by: NURSE PRACTITIONER

## 2023-02-01 PROCEDURE — 3079F PR MOST RECENT DIASTOLIC BLOOD PRESSURE 80-89 MM HG: ICD-10-PCS | Mod: CPTII,S$GLB,, | Performed by: NURSE PRACTITIONER

## 2023-02-01 PROCEDURE — 3074F SYST BP LT 130 MM HG: CPT | Mod: CPTII,S$GLB,, | Performed by: NURSE PRACTITIONER

## 2023-02-01 PROCEDURE — 3008F BODY MASS INDEX DOCD: CPT | Mod: CPTII,S$GLB,, | Performed by: NURSE PRACTITIONER

## 2023-02-01 RX ORDER — TOBRAMYCIN 3 MG/ML
SOLUTION/ DROPS OPHTHALMIC
COMMUNITY
Start: 2023-01-09 | End: 2024-02-07

## 2023-02-01 RX ORDER — APIXABAN 5 MG/1
5 TABLET, FILM COATED ORAL 2 TIMES DAILY
COMMUNITY
Start: 2023-01-06

## 2023-02-01 NOTE — PROGRESS NOTES
Subjective:       Patient ID: Lenny Hughes is a 62 y.o. male.    Chief Complaint:  Transient Ischemic Attack (6 month follow up for TIA. Patient denies all deficits.)      History of Present Illness  Patient presents for follow up of TIA. Patient had recent CTA head/neck. Presented to [facility] with acute RLE/LLE numbness, RUE clumsiness.  MRI brain neg for acute stroke.  Reports no further symptoms.  On Eliquis for PAF. Recently diagnosed with NANETTE. Reports he will be getting CPAP soon.         CTA HEAD; CTA NECK     CLINICAL HISTORY:  Stroke, follow up;  Other cerebral infarction     TECHNIQUE:  Axial images of the head and neck were obtained with IV contrast administration in the arterial phase.  Coronal and sagittal reconstructions were provided.  Three dimensional and MIP images were obtained and evaluated.  Total DLP was 2814 mGy-cm. Dose lowering technique and automated exposure control were utilized for this exam.The degree of carotid stenosis was evaluated using NASCET criteria.     COMPARISON:  MRI of the brain 12/06/2022.     FINDINGS:  Vascular findings:     There is a bovine configuration of the aortic arch.  The brachiocephalic artery is normal.  The right common carotid artery is normal.  There is minimal calcified plaque in the proximal right ICA without flow limiting stenosis.  The remainder of the right cervical ICA is normal.  The left common carotid artery is normal.  There is mixed density plaque in the left carotid bifurcation and proximal left ICA without flow limiting stenosis.  The remainder of the left cervical ICA is normal.  There are codominant vertebral arteries which are normal throughout their cervical courses.  There is no focal stenosis.  There is no dissection.     The intradural vertebral arteries are normal.  The basilar artery is normal.  There are scattered calcifications in the cavernous carotids.  The [id number] is intact.  There is no aneurysm.  There is no focal  stenosis.  There is no paucity of vasculature.  The dural venous sinuses are patent.     Nonvascular findings:     There is no hemorrhage, hydrocephalus, or midline shift.  There is no abnormal intracranial enhancement.  The bilateral orbits are normal.  The paranasal sinuses are well aerated.  The airway is patent.  There is no cervical adenopathy.  The thyroid gland is normal.  The visualized lung apices are clear.  There is no lytic or blastic osseous lesion.     Impression:     Mixed density plaque in the bilateral carotid bifurcation with out flow limiting stenosis.    Review of Systems  Review of Systems   Neurological:  Negative for dizziness, seizures, facial asymmetry, speech difficulty, weakness, light-headedness and headaches.   All other systems reviewed and are negative.    Objective:      Neurologic Exam     Mental Status   Oriented to person, place, and time.   Speech: speech is normal   Level of consciousness: alert  Knowledge: good.     Cranial Nerves   Cranial nerves II through XII intact.     Motor Exam   Muscle bulk: normal    Strength   Strength 5/5 throughout.     Sensory Exam   Light touch normal.     Gait, Coordination, and Reflexes     Gait  Gait: normal    Physical Exam  Vitals and nursing note reviewed.   Pulmonary:      Effort: Pulmonary effort is normal.   Neurological:      Mental Status: He is oriented to person, place, and time.      Cranial Nerves: Cranial nerves 2-12 are intact.      Motor: Motor strength is normal.      Gait: Gait is intact.   Psychiatric:         Speech: Speech normal.         Assessment:        1. Transient ischemic attack (TIA)    2. Paroxysmal atrial fibrillation      Plan:     Continue Eliquis and follow up with Dr. Allison  Continue NANETTE follow up with Dr. Ramirez in [place]  Continue statin  Secondary stroke prevention measures discussed. Education provided on signs and symptoms of stroke; advised to call 9-1-1 with any new onset of numbness, tingling or weakness,  difficulty with speech or facial droop.

## 2023-05-15 DIAGNOSIS — M79.604 LEG PAIN, BILATERAL: Primary | ICD-10-CM

## 2023-05-15 DIAGNOSIS — M79.605 LEG PAIN, BILATERAL: Primary | ICD-10-CM

## 2023-05-16 ENCOUNTER — HOSPITAL ENCOUNTER (OUTPATIENT)
Dept: RADIOLOGY | Facility: HOSPITAL | Age: 63
Discharge: HOME | End: 2023-05-16
Attending: PEDIATRICS
Payer: COMMERCIAL

## 2023-05-16 DIAGNOSIS — M79.604 LEG PAIN, BILATERAL: ICD-10-CM

## 2023-05-16 DIAGNOSIS — M79.605 LEG PAIN, BILATERAL: ICD-10-CM

## 2023-05-16 PROCEDURE — 93971 EXTREMITY STUDY: CPT | Mod: TC,RT

## 2023-05-22 DIAGNOSIS — Z12.11 SCREEN FOR COLON CANCER: Primary | ICD-10-CM

## 2023-05-24 ENCOUNTER — CLINICAL SUPPORT (OUTPATIENT)
Dept: RESPIRATORY THERAPY | Facility: HOSPITAL | Age: 63
End: 2023-05-24
Attending: SURGERY
Payer: COMMERCIAL

## 2023-05-24 DIAGNOSIS — Z12.11 SCREEN FOR COLON CANCER: ICD-10-CM

## 2023-05-24 PROCEDURE — 93005 ELECTROCARDIOGRAM TRACING: CPT

## 2023-05-24 NOTE — DISCHARGE INSTRUCTIONS
Follow prep on Sunday. Clear liquids only. Nothing by mouth after midnight. Stop Eliquis 5/27/23. Take Amlodipine and Metoprolol AM of procedure with small sip of water.                                                                                            INSTRUCTIONS  AFTER A COLONOSCOPY/EGD                                                                                    NO DRIVING X 24 HOURS. NOTIFY YOUR DOCTOR WITH                                                                                 ABDOMINAL PAIN UNRELIEVED BY  PASSING GAS,                                                                           FEVER WITHIN 24 HOURS, ARE LARGE AMOUNT OF BLEEDING.

## 2023-05-26 ENCOUNTER — ANESTHESIA EVENT (OUTPATIENT)
Dept: SURGERY | Facility: HOSPITAL | Age: 63
End: 2023-05-26
Payer: COMMERCIAL

## 2023-05-29 ENCOUNTER — HOSPITAL ENCOUNTER (OUTPATIENT)
Facility: HOSPITAL | Age: 63
Discharge: HOME | End: 2023-05-29
Attending: SURGERY | Admitting: SURGERY
Payer: COMMERCIAL

## 2023-05-29 ENCOUNTER — ANESTHESIA (OUTPATIENT)
Dept: SURGERY | Facility: HOSPITAL | Age: 63
End: 2023-05-29
Payer: COMMERCIAL

## 2023-05-29 VITALS
HEART RATE: 80 BPM | WEIGHT: 250 LBS | SYSTOLIC BLOOD PRESSURE: 128 MMHG | DIASTOLIC BLOOD PRESSURE: 76 MMHG | BODY MASS INDEX: 35.79 KG/M2 | OXYGEN SATURATION: 96 % | RESPIRATION RATE: 16 BRPM | TEMPERATURE: 98 F | HEIGHT: 70 IN

## 2023-05-29 DIAGNOSIS — Z12.11 SCREENING FOR COLON CANCER: ICD-10-CM

## 2023-05-29 DIAGNOSIS — Z12.11 SCREEN FOR COLON CANCER: Primary | ICD-10-CM

## 2023-05-29 PROCEDURE — D9220A PRA ANESTHESIA: Mod: 33,,, | Performed by: NURSE ANESTHETIST, CERTIFIED REGISTERED

## 2023-05-29 PROCEDURE — 45385 COLONOSCOPY W/LESION REMOVAL: CPT | Mod: PT | Performed by: SURGERY

## 2023-05-29 PROCEDURE — 63600175 PHARM REV CODE 636 W HCPCS: Performed by: NURSE ANESTHETIST, CERTIFIED REGISTERED

## 2023-05-29 PROCEDURE — 25000003 PHARM REV CODE 250: Performed by: NURSE ANESTHETIST, CERTIFIED REGISTERED

## 2023-05-29 PROCEDURE — C1773 RET DEV, INSERTABLE: HCPCS | Performed by: SURGERY

## 2023-05-29 PROCEDURE — 37000008 HC ANESTHESIA 1ST 15 MINUTES: Performed by: SURGERY

## 2023-05-29 PROCEDURE — D9220A PRA ANESTHESIA: ICD-10-PCS | Mod: 33,,, | Performed by: NURSE ANESTHETIST, CERTIFIED REGISTERED

## 2023-05-29 PROCEDURE — 37000009 HC ANESTHESIA EA ADD 15 MINS: Performed by: SURGERY

## 2023-05-29 PROCEDURE — 63600175 PHARM REV CODE 636 W HCPCS: Performed by: ANESTHESIOLOGY

## 2023-05-29 RX ORDER — SODIUM CHLORIDE, SODIUM LACTATE, POTASSIUM CHLORIDE, CALCIUM CHLORIDE 600; 310; 30; 20 MG/100ML; MG/100ML; MG/100ML; MG/100ML
INJECTION, SOLUTION INTRAVENOUS CONTINUOUS
Status: DISCONTINUED | OUTPATIENT
Start: 2023-05-29 | End: 2024-02-07

## 2023-05-29 RX ORDER — LIDOCAINE HYDROCHLORIDE 20 MG/ML
INJECTION, SOLUTION EPIDURAL; INFILTRATION; INTRACAUDAL; PERINEURAL
Status: DISCONTINUED | OUTPATIENT
Start: 2023-05-29 | End: 2023-05-29

## 2023-05-29 RX ORDER — PROPOFOL 10 MG/ML
VIAL (ML) INTRAVENOUS
Status: DISCONTINUED | OUTPATIENT
Start: 2023-05-29 | End: 2023-05-29

## 2023-05-29 RX ORDER — SODIUM CHLORIDE 9 MG/ML
INJECTION, SOLUTION INTRAVENOUS CONTINUOUS
Status: DISCONTINUED | OUTPATIENT
Start: 2023-05-29 | End: 2023-05-29 | Stop reason: HOSPADM

## 2023-05-29 RX ADMIN — PROPOFOL 50 MG: 10 INJECTION, EMULSION INTRAVENOUS at 08:05

## 2023-05-29 RX ADMIN — SODIUM CHLORIDE, POTASSIUM CHLORIDE, SODIUM LACTATE AND CALCIUM CHLORIDE: 600; 310; 30; 20 INJECTION, SOLUTION INTRAVENOUS at 07:05

## 2023-05-29 RX ADMIN — LIDOCAINE HYDROCHLORIDE 60 MG: 20 INJECTION, SOLUTION EPIDURAL; INFILTRATION; INTRACAUDAL; PERINEURAL at 08:05

## 2023-05-29 RX ADMIN — PROPOFOL 20 MG: 10 INJECTION, EMULSION INTRAVENOUS at 08:05

## 2023-05-29 RX ADMIN — PROPOFOL 100 MG: 10 INJECTION, EMULSION INTRAVENOUS at 08:05

## 2023-05-29 NOTE — ANESTHESIA PREPROCEDURE EVALUATION
05/29/2023  Lenny Hughes is a 62 y.o., male.      Pre-op Assessment    I have reviewed the Patient Summary Reports.    I have reviewed the NPO Status.   I have reviewed the Medications.     Review of Systems  Anesthesia Hx:  No problems with previous Anesthesia  Neg history of prior surgery. Denies Family Hx of Anesthesia complications.   Denies Personal Hx of Anesthesia complications.   Social:  Former Smoker    Hematology/Oncology:  Hematology Normal   Oncology Normal     EENT/Dental:EENT/Dental Normal   Cardiovascular:   Hypertension Dysrhythmias atrial fibrillation hyperlipidemia    Pulmonary:   Sleep Apnea    Hepatic/GI:  Hepatic/GI Normal    Musculoskeletal:  Musculoskeletal Normal    Neurological:   CVA    Endocrine:  Obesity / BMI > 30  Dermatological:  Skin Normal    Psych:  Psychiatric Normal           Physical Exam  General: Cooperative and Alert    Airway:  Mallampati: II   Mouth Opening: Normal  TM Distance: Normal  Tongue: Normal  Neck ROM: Normal ROM    Dental:  Intact        Anesthesia Plan  Type of Anesthesia, risks & benefits discussed:    Anesthesia Type: Gen Natural Airway  Intra-op Monitoring Plan: Standard ASA Monitors  Post Op Pain Control Plan: multimodal analgesia  Induction:  IV  Informed Consent: Informed consent signed with the Patient and all parties understand the risks and agree with anesthesia plan.  All questions answered. Patient consented to blood products? Yes  ASA Score: 2  Day of Surgery Review of History & Physical: I have interviewed and examined the patient. I have reviewed the patient's H&P dated: There are no significant changes.     Ready For Surgery From Anesthesia Perspective.     .

## 2023-05-29 NOTE — DISCHARGE SUMMARY
[facility] - Periop Services  Discharge Note  Short Stay    Procedure(s) (LRB):  COLONOSCOPY (N/A)  COLONOSCOPY, WITH POLYPECTOMY USING SNARE (N/A) removed at 65 and 45 cm with a hot loop snare      OUTCOME: Patient tolerated treatment/procedure well without complication and is now ready for discharge.    DISPOSITION: Home or Self Care      FINAL DIAGNOSIS:  Screen for colon cancer normal terminal ileum, normal cecum ascending colon transverse colon, polyp at 65 and 45 cm removed with a hot loop snare, diverticular disease of the sigmoid colon, grade 2-3 internal hemorrhoids    FOLLOWUP: In clinic one-week    DISCHARGE INSTRUCTIONS:    Discharge Procedure Orders   Diet general         Clinical Reference Documents Added to Patient Instructions         Document    COLONOSCOPY DISCHARGE INSTRUCTIONS (ENGLISH)            TIME SPENT ON DISCHARGE:  5 minutes

## 2023-05-29 NOTE — OP NOTE
[facility]  Operative Note      Date of Procedure: 5/29/2023     Procedure: Procedure(s) (LRB):  COLONOSCOPY (N/A)  COLONOSCOPY, WITH POLYPECTOMY USING SNARE (N/A)     Surgeon(s) and Role:     * Rashid Marley MD - Primary    Assisting Surgeon: None    Pre-Operative Diagnosis: Screen for colon cancer [Z12.11]    Post-Operative Diagnosis: Post-Op Diagnosis Codes:     * Screen for colon cancer [Z12.11]  1. Normal terminal ileum  2. Normal cecum ascending colon transverse colon  3. Polyp at 65 cm removed with hot loop snare  4. Polyp at 45 cm removed with a hot loop snare  5. Diverticulosis of the rectosigmoid colon  6. Grade 2-3 internal hemorrhoids with good tone no masses   Anesthesia: General    Operative Findings (including complications, if any):  Patient is a 62-year-old  male with a history of atrial fibrillation on Eliquis and metoprolol as well as hypercholesterolemia hypertension previous stroke in December of 2022 with a TIA that showed right-sided weakness with left hemispheric stroke.  Patient is morbidly obese at 5 ft 11 in 247 lb patient had need for age-appropriate screening colonoscopy last colonoscopy was over 10 years ago no family history of colon cancer no blood in the stool and no weight loss no change in bowel movements.  Patient was sedated and underwent colonoscopic screening to the cecum with intubation of ileocecal valve     Terminal ileum was normal up to 10-20 cm   Cecum ascending colon transverse colon were normal   Polyp at 65 cm was removed with a hot loop snare clear margin obtained it was about a cm in size no ink spot was necessary   Polyp at 45 cm removed with a hot loop snare clear margin obtained no ink spot was necessary about a cm in size   There was diverticulosis of the sigmoid colon   There was grade 2-3 internal hemorrhoids with good tone no masses    Plan  1. Follow up in the office to review Biopsy of the colon at 65 and 45 cm    2. Follow up in 2 years recheck stools for blood   3. Follow up in 3 years repeat screening colonoscopy        Description of Technical Procedures:  Noted above       Significant Surgical Tasks Conducted by the Assistant(s), if Applicable:  None       Estimated Blood Loss (EBL): * No values recorded between 5/29/2023  8:27 AM and 5/29/2023  8:53 AM *           Implants: * No implants in log *    Specimens:   Specimen (24h ago, onward)       Start     Ordered    05/29/23 0843  Specimen to Pathology  RELEASE UPON ORDERING        References:    Click here for ordering Quick Tip   Question:  Release to patient  Answer:  Immediate    05/29/23 0843                            Condition: Good    Disposition: PACU - hemodynamically stable.    Attestation: I was present and scrubbed for the entire procedure.    Discharge Note    OUTCOME: Patient tolerated treatment/procedure well without complication and is now ready for discharge.        DISPOSITION: Home or Self Care    FINAL DIAGNOSIS:  Screen for colon cancer normal ileum, normal cecum ascending colon transverse colon descending colon, polyp at 65 cm removed with a hot loop snare, polyp at 45 cm removed with a hot loop snare, diverticular disease of the sigmoid colon, grade 2-3 internal hemorrhoids .    FOLLOWUP: In clinic 1 week    DISCHARGE INSTRUCTIONS:    Discharge Procedure Orders   Diet general        Clinical Reference Documents Added to Patient Instructions         Document    COLONOSCOPY DISCHARGE INSTRUCTIONS (ENGLISH)

## 2023-05-30 LAB — PSYCHE PATHOLOGY RESULT: NORMAL

## 2023-10-16 NOTE — ASSESSMENT & PLAN NOTE
Ferrous sulfate passed the refill protocol.Refilled.ac   To L side with L facial numbness    -await MRI brain w/o  -normotension  -PT/OT/ST  -pt given  mg  -continue ASA 81 mg daily and atorvastatin 40 mg daily  -fall precautions

## 2024-02-07 ENCOUNTER — OFFICE VISIT (OUTPATIENT)
Dept: NEUROLOGY | Facility: CLINIC | Age: 64
End: 2024-02-07
Payer: COMMERCIAL

## 2024-02-07 VITALS
DIASTOLIC BLOOD PRESSURE: 93 MMHG | BODY MASS INDEX: 35.79 KG/M2 | HEIGHT: 70 IN | WEIGHT: 250 LBS | HEART RATE: 71 BPM | SYSTOLIC BLOOD PRESSURE: 133 MMHG

## 2024-02-07 DIAGNOSIS — G45.9 TIA (TRANSIENT ISCHEMIC ATTACK): Primary | ICD-10-CM

## 2024-02-07 PROCEDURE — 1159F MED LIST DOCD IN RCRD: CPT | Mod: CPTII,S$GLB,, | Performed by: NURSE PRACTITIONER

## 2024-02-07 PROCEDURE — 3075F SYST BP GE 130 - 139MM HG: CPT | Mod: CPTII,S$GLB,, | Performed by: NURSE PRACTITIONER

## 2024-02-07 PROCEDURE — 99999 PR PBB SHADOW E&M-EST. PATIENT-LVL III: CPT | Mod: PBBFAC,,, | Performed by: NURSE PRACTITIONER

## 2024-02-07 PROCEDURE — 99213 OFFICE O/P EST LOW 20 MIN: CPT | Mod: S$GLB,,, | Performed by: NURSE PRACTITIONER

## 2024-02-07 PROCEDURE — 1160F RVW MEDS BY RX/DR IN RCRD: CPT | Mod: CPTII,S$GLB,, | Performed by: NURSE PRACTITIONER

## 2024-02-07 PROCEDURE — 3008F BODY MASS INDEX DOCD: CPT | Mod: CPTII,S$GLB,, | Performed by: NURSE PRACTITIONER

## 2024-02-07 PROCEDURE — 3080F DIAST BP >= 90 MM HG: CPT | Mod: CPTII,S$GLB,, | Performed by: NURSE PRACTITIONER

## 2024-02-07 NOTE — PROGRESS NOTES
Subjective:      Patient ID: Lenny Hughes is a 63 y.o. male.    Chief Complaint:  Transient Ischemic Attack (1 yr f/u, pt denies any TIA symptoms)      History of Present Illness  An office visit of an established patient, 63 years old. Prior to the patient's arrival on the same day, the provider spends 10 minutes reviewing the results of lab work, hospital stay and physician notes. Once in the exam room with the patient, the provider then spends 10 minutes in the room with the member performing a history and exam as well as reviewing the test results and recommendations with the patient. After leaving the exam room, the provider then spends an additional 5 minutes completing the electronic health record. The total time spent that day caring for the member is 25 minutes, and this time--including the breakdown--is documented in the medical record.      Patient presents for follow up of TIA. Patient reports he is taking Eliquis as prescribed. Started use of CPAP this past year. Taking Statin. Denies any new onset of numbness, tingling or weakness. Denies any difficulty with speech. Reports overall doing well.        Past Medical History:   Diagnosis Date    Atrial fibrillation     Hyperlipidemia     Hypertension     NANETTE (obstructive sleep apnea)     CPAP    Stroke        Past Surgical History:   Procedure Laterality Date    BACK SURGERY      COLONOSCOPY      COLONOSCOPY N/A 5/29/2023    Procedure: COLONOSCOPY;  Surgeon: Rashid Marley MD;  Location: [facility];  Service: Endoscopy;  Laterality: N/A;    COLONOSCOPY, WITH POLYPECTOMY USING SNARE N/A 5/29/2023    Procedure: COLONOSCOPY, WITH POLYPECTOMY USING SNARE;  Surgeon: Rashid Marley MD;  Location: [facility];  Service: Endoscopy;  Laterality: N/A;  polyp at 65cm   polyp at 45cm        Family History   Problem Relation Age of Onset    Atrial fibrillation Mother     Crohn's disease Mother     Heart disease Father     Heart disease Brother        Social  "History     Socioeconomic History    Marital status:    Tobacco Use    Smoking status: Former     Current packs/day: 0.00     Types: Cigarettes     Quit date: 2019     Years since quittin.1    Smokeless tobacco: Never   Substance and Sexual Activity    Alcohol use: Yes     Alcohol/week: 14.0 standard drinks of alcohol     Types: 10 Cans of beer, 4 Shots of liquor per week     Comment: Daily    Drug use: Never    Sexual activity: Yes     Partners: Female       Current Outpatient Medications   Medication Sig Dispense Refill    amLODIPine (NORVASC) 10 MG tablet Take 10 mg by mouth every morning.      atorvastatin (LIPITOR) 40 MG tablet Take 1 tablet (40 mg total) by mouth once daily. (Patient taking differently: Take 40 mg by mouth every morning.) 90 tablet 3    ELIQUIS 5 mg Tab Take 5 mg by mouth 2 (two) times daily. Stopped 23      hydroCHLOROthiazide (HYDRODIURIL) 25 MG tablet Take 25 mg by mouth every morning.      metoprolol tartrate (LOPRESSOR) 25 MG tablet Take 1 tablet (25 mg total) by mouth 2 (two) times daily. 60 tablet 11     No current facility-administered medications for this visit.       Review of patient's allergies indicates:  No Known Allergies     Vitals:    24 0803   BP: (!) 133/93   BP Location: Left arm   Patient Position: Sitting   Pulse: 71   Weight: 113.4 kg (250 lb)   Height: 5' 10" (1.778 m)      Review of Systems  12 point ROS performed and negative unless otherwise documented in HPI  Objective:     Neurologic Exam      Mental Status   Oriented to person, place, and time.   Speech: speech is normal   Level of consciousness: alert  Knowledge: good.      Cranial Nerves   Cranial nerves II through XII intact.      Motor Exam   Muscle bulk: normal     Strength   Strength 5/5 throughout.      Sensory Exam   Light touch normal.      Gait, Coordination, and Reflexes      Gait  Gait: normal     Physical Exam  Vitals and nursing note reviewed.   Pulmonary:      Effort: " Pulmonary effort is normal.   Neurological:      Mental Status: He is oriented to person, place, and time.      Cranial Nerves: Cranial nerves 2-12 are intact.      Motor: Motor strength is normal.      Gait: Gait is intact.   Psychiatric:         Speech: Speech normal.   Assessment:     1. TIA (transient ischemic attack)        Plan:      Goal LDL <70; continue statin  Goal BP <140/90  Continue Eliquis  Encouraged continued use of CPAP  Secondary stroke prevention measures discussed. Education provided on signs and symptoms of stroke; advised to call 9-1-1 with any new onset of numbness, tingling or weakness, difficulty with speech or facial droop.

## 2025-02-19 ENCOUNTER — HOSPITAL ENCOUNTER (EMERGENCY)
Facility: HOSPITAL | Age: 65
Discharge: HOME | End: 2025-02-19
Attending: EMERGENCY MEDICINE
Payer: COMMERCIAL

## 2025-02-19 VITALS
RESPIRATION RATE: 19 BRPM | SYSTOLIC BLOOD PRESSURE: 129 MMHG | HEIGHT: 71 IN | BODY MASS INDEX: 36.4 KG/M2 | OXYGEN SATURATION: 97 % | WEIGHT: 260 LBS | TEMPERATURE: 98 F | DIASTOLIC BLOOD PRESSURE: 97 MMHG | HEART RATE: 84 BPM

## 2025-02-19 DIAGNOSIS — R20.0 NUMBNESS: Primary | ICD-10-CM

## 2025-02-19 LAB
ALBUMIN SERPL-MCNC: 3.7 G/DL (ref 3.4–4.8)
ALBUMIN/GLOB SERPL: 0.8 RATIO (ref 1.1–2)
ALP SERPL-CCNC: 60 UNIT/L (ref 40–150)
ALT SERPL-CCNC: 30 UNIT/L (ref 0–55)
ANION GAP SERPL CALC-SCNC: 6 MEQ/L
AST SERPL-CCNC: 26 UNIT/L (ref 5–34)
BASOPHILS # BLD AUTO: 0.04 X10(3)/MCL
BASOPHILS NFR BLD AUTO: 0.5 %
BILIRUB SERPL-MCNC: 0.6 MG/DL
BUN SERPL-MCNC: 23.4 MG/DL (ref 8.4–25.7)
CALCIUM SERPL-MCNC: 10.1 MG/DL (ref 8.8–10)
CHLORIDE SERPL-SCNC: 106 MMOL/L (ref 98–107)
CO2 SERPL-SCNC: 27 MMOL/L (ref 23–31)
CREAT SERPL-MCNC: 0.99 MG/DL (ref 0.72–1.25)
CREAT/UREA NIT SERPL: 24
EOSINOPHIL # BLD AUTO: 0.04 X10(3)/MCL (ref 0–0.9)
EOSINOPHIL NFR BLD AUTO: 0.5 %
ERYTHROCYTE [DISTWIDTH] IN BLOOD BY AUTOMATED COUNT: 14.6 % (ref 11.5–17)
GFR SERPLBLD CREATININE-BSD FMLA CKD-EPI: >60 ML/MIN/1.73/M2
GLOBULIN SER-MCNC: 4.5 GM/DL (ref 2.4–3.5)
GLUCOSE SERPL-MCNC: 101 MG/DL (ref 82–115)
HCT VFR BLD AUTO: 44.7 % (ref 42–52)
HGB BLD-MCNC: 14.8 G/DL (ref 14–18)
IMM GRANULOCYTES # BLD AUTO: 0.02 X10(3)/MCL (ref 0–0.04)
IMM GRANULOCYTES NFR BLD AUTO: 0.3 %
LYMPHOCYTES # BLD AUTO: 2.98 X10(3)/MCL (ref 0.6–4.6)
LYMPHOCYTES NFR BLD AUTO: 39.8 %
MAGNESIUM SERPL-MCNC: 2.1 MG/DL (ref 1.6–2.6)
MCH RBC QN AUTO: 30.7 PG (ref 27–31)
MCHC RBC AUTO-ENTMCNC: 33.1 G/DL (ref 33–36)
MCV RBC AUTO: 92.7 FL (ref 80–94)
MONOCYTES # BLD AUTO: 0.76 X10(3)/MCL (ref 0.1–1.3)
MONOCYTES NFR BLD AUTO: 10.1 %
NEUTROPHILS # BLD AUTO: 3.65 X10(3)/MCL (ref 2.1–9.2)
NEUTROPHILS NFR BLD AUTO: 48.8 %
NRBC BLD AUTO-RTO: 0 %
PLATELET # BLD AUTO: 248 X10(3)/MCL (ref 130–400)
PMV BLD AUTO: 9.6 FL (ref 7.4–10.4)
POCT GLUCOSE: 106 MG/DL (ref 70–110)
POTASSIUM SERPL-SCNC: 3.7 MMOL/L (ref 3.5–5.1)
PROT SERPL-MCNC: 8.2 GM/DL (ref 5.8–7.6)
RBC # BLD AUTO: 4.82 X10(6)/MCL (ref 4.7–6.1)
SODIUM SERPL-SCNC: 139 MMOL/L (ref 136–145)
WBC # BLD AUTO: 7.49 X10(3)/MCL (ref 4.5–11.5)

## 2025-02-19 PROCEDURE — 82962 GLUCOSE BLOOD TEST: CPT

## 2025-02-19 PROCEDURE — 83735 ASSAY OF MAGNESIUM: CPT

## 2025-02-19 PROCEDURE — 80053 COMPREHEN METABOLIC PANEL: CPT | Performed by: PHYSICIAN ASSISTANT

## 2025-02-19 PROCEDURE — 99284 EMERGENCY DEPT VISIT MOD MDM: CPT | Mod: 25

## 2025-02-19 PROCEDURE — 85025 COMPLETE CBC W/AUTO DIFF WBC: CPT | Performed by: PHYSICIAN ASSISTANT

## 2025-02-19 RX ORDER — GABAPENTIN 300 MG/1
300 CAPSULE ORAL 3 TIMES DAILY
Qty: 30 CAPSULE | Refills: 0 | Status: SHIPPED | OUTPATIENT
Start: 2025-02-19 | End: 2025-03-01

## 2025-02-19 NOTE — FIRST PROVIDER EVALUATION
Medical screening examination initiated.  I have conducted a focused provider triage encounter, findings are as follows:    Brief history of present illness:  64-year-old male with a history of TIA presents to ED for evaluation numbness and tingling to his hands and feet.  States he has a burning sensation feet.  Denies any neck or back pain.    There were no vitals filed for this visit.    Pertinent physical exam:  Patient is awake and alert and oriented.  Ambulatory to triage.  In no acute distress.      Brief workup plan:  labs    Preliminary workup initiated; this workup will be continued and followed by the physician or advanced practice provider that is assigned to the patient when roomed.

## 2025-02-19 NOTE — ED PROVIDER NOTES
"Encounter Date: 2/19/2025       History     Chief Complaint   Patient presents with    Numbness     Patient reports bilateral hand numbness and bilateral feet burning that started last night. Denies neck/back pain.      64 y.o. White male with a history of CVA, Hypertension, and Hyperlipidemia presents to Emergency Department with a chief complaint of numbness. Symptoms began on yesterday and have been constant since onset. Reports numbness to bilateral finger and bilateral toes. Associated symptoms include none. Patient describes numbness as a "burning sensation". The patient denies CP, SOB, injury/trauma, neck pain, back pain, or vomiting.  No other reported symptoms at this time      The history is provided by the patient. No  was used.   Illness   The current episode started yesterday. The problem occurs continuously. The problem has been unchanged. Pertinent negatives include no fever, no photophobia, no abdominal pain, no nausea, no vomiting, no headaches, no stridor, no shortness of breath, no URI and no wheezing. He has received no recent medical care.     Review of patient's allergies indicates:  No Known Allergies  Past Medical History:   Diagnosis Date    Atrial fibrillation     Hyperlipidemia     Hypertension     NANETTE (obstructive sleep apnea)     CPAP    Stroke      Past Surgical History:   Procedure Laterality Date    BACK SURGERY      COLONOSCOPY      COLONOSCOPY N/A 5/29/2023    Procedure: COLONOSCOPY;  Surgeon: Rashid Marley MD;  Location: [facility];  Service: Endoscopy;  Laterality: N/A;    COLONOSCOPY, WITH POLYPECTOMY USING SNARE N/A 5/29/2023    Procedure: COLONOSCOPY, WITH POLYPECTOMY USING SNARE;  Surgeon: Rashid Marley MD;  Location: [facility];  Service: Endoscopy;  Laterality: N/A;  polyp at 65cm   polyp at 45cm      Family History   Problem Relation Name Age of Onset    Atrial fibrillation Mother      Crohn's disease Mother      Heart disease Father      Heart " disease Brother       Social History[1]  Review of Systems   Constitutional:  Negative for diaphoresis, fatigue and fever.   Eyes:  Negative for photophobia and visual disturbance.   Respiratory:  Negative for shortness of breath, wheezing and stridor.    Cardiovascular:  Negative for chest pain, palpitations and leg swelling.   Gastrointestinal:  Negative for abdominal pain, nausea and vomiting.   Neurological:  Positive for numbness. Negative for dizziness, syncope, weakness and headaches.   All other systems reviewed and are negative.      Physical Exam     Initial Vitals [02/19/25 1051]   BP Pulse Resp Temp SpO2   (!) 127/91 87 16 97.5 °F (36.4 °C) 99 %      MAP       --         Physical Exam    Nursing note and vitals reviewed.  Constitutional: He appears well-developed and well-nourished. He is not diaphoretic. He is cooperative.  Non-toxic appearance. No distress.   HENT:   Head: Normocephalic and atraumatic.   Right Ear: External ear normal.   Left Ear: External ear normal.   Nose: Nose normal.   Eyes: Conjunctivae and EOM are normal. Pupils are equal, round, and reactive to light.   Neck: Neck supple.   Normal range of motion.  Cardiovascular:  Normal rate, regular rhythm, S1 normal, S2 normal, normal heart sounds, intact distal pulses and normal pulses.           Pulses:       Radial pulses are 2+ on the right side and 2+ on the left side.        Dorsalis pedis pulses are 2+ on the right side and 2+ on the left side.        Posterior tibial pulses are 2+ on the right side and 2+ on the left side.   Pulmonary/Chest: Effort normal and breath sounds normal. No tachypnea and no bradypnea. No respiratory distress. He has no decreased breath sounds. He has no wheezes. He has no rhonchi. He has no rales.   Abdominal: Abdomen is soft. Bowel sounds are normal. He exhibits no distension. There is no abdominal tenderness. There is no rebound.   Musculoskeletal:         General: Normal range of motion.      Cervical  back: Normal range of motion and neck supple.     Neurological: He is alert and oriented to person, place, and time. He has normal strength. No cranial nerve deficit. He exhibits normal muscle tone. He displays a negative Romberg sign. Coordination and gait normal. GCS score is 15. GCS eye subscore is 4. GCS verbal subscore is 5. GCS motor subscore is 6.   Mental status   Alert and attentive   Speech clear and fluent with normal comprehension   Able to provide clear account of historical and recent events   Oriented to person place and time     Cranial nerves   2. Pupils equal round reactive to light bilaterally, right eye:  No visual defects  Left eye:  No visual defects  3. Bilateral eye adduction and elevation without diplopia    4. Bilateral eye adduction and depression without diplopia   6. Bilateral eye abduction with diplopia  5. Masseter muscle normal bulk and jaw opens symmetrically   7. Face symmetric during speech.  Wrinkle forehead and smile symmetrically intact, bilaterally   8.  Response to verbal stimulus   9. Normal speech without breathlessness or hoarseness   10. Normal speech without breathlessness or hoarseness  12. Tongue protrudes midline    Motor exam   Upper extremities: No pronator drift   Lower extremities: Symmetric 5/5   Coordination:  No nystagmus, no saccadic pursuit on eye range of motion    Gait:  Normal gait       C/o burning sensation to bilateral fingers and bilateral toes.   Skin: Skin is warm and dry. Capillary refill takes less than 2 seconds.   Psychiatric: He has a normal mood and affect. Thought content normal.         ED Course   Procedures  Labs Reviewed   COMPREHENSIVE METABOLIC PANEL - Abnormal       Result Value    Sodium 139      Potassium 3.7      Chloride 106      CO2 27      Glucose 101      Blood Urea Nitrogen 23.4      Creatinine 0.99      Calcium 10.1 (*)     Protein Total 8.2 (*)     Albumin 3.7      Globulin 4.5 (*)     Albumin/Globulin Ratio 0.8 (*)      Bilirubin Total 0.6      ALP 60      ALT 30      AST 26      eGFR >60      Anion Gap 6.0      BUN/Creatinine Ratio 24     MAGNESIUM - Normal    Magnesium Level 2.10     CBC W/ AUTO DIFFERENTIAL    Narrative:     The following orders were created for panel order CBC auto differential.  Procedure                               Abnormality         Status                     ---------                               -----------         ------                     CBC with Differential[[id number]]                            Final result                 Please view results for these tests on the individual orders.   CBC WITH DIFFERENTIAL    WBC 7.49      RBC 4.82      Hgb 14.8      Hct 44.7      MCV 92.7      MCH 30.7      MCHC 33.1      RDW 14.6      Platelet 248      MPV 9.6      Neut % 48.8      Lymph % 39.8      Mono % 10.1      Eos % 0.5      Basophil % 0.5      Imm Grans % 0.3      Neut # 3.65      Lymph # 2.98      Mono # 0.76      Eos # 0.04      Baso # 0.04      Imm Gran # 0.02      NRBC% 0.0     POCT GLUCOSE    POCT Glucose 106            Imaging Results              CT Head Without Contrast (Final result)  Result time 02/19/25 12:47:19      Final result by Jazz Alcazar MD (02/19/25 12:47:19)                   Impression:      No acute intracranial abnormality.      Electronically signed by: Jazz Alcazar  Date:    02/19/2025  Time:    12:47               Narrative:    EXAMINATION:  CT HEAD WITHOUT CONTRAST    CLINICAL HISTORY:  Neuro deficit, acute, stroke suspected;    TECHNIQUE:  Axial scans were obtained from skull base to the vertex.    Coronal and sagittal reconstructions obtained from the axial data.    Automatic exposure control was utilized to limit radiation dose.    Contrast: None    Radiation Dose:    Total DLP: 1031 mGy*cm    COMPARISON:  CT head dated 12/06/2022    FINDINGS:  There is no acute intracranial hemorrhage or edema. The gray-white matter differentiation is preserved.    There is no  mass effect or midline shift. The ventricles and sulci are normal in size. The basal cisterns are patent. There is no abnormal extra-axial fluid collection.    The calvarium and skull base are intact. The visualized paranasal sinuses and the mastoid air cells are clear.                                       Medications - No data to display  Medical Decision Making  Patient awake, alert, has non-labored breathing, and follows commands appropriately. Arrived to ED due to numbness to bilateral fingers and bilateral feet that began on yesterday. Patient reports similar episode several months ago. Afebrile. Denies injury/trauma. NAD Noted.     Judging by the patient's chief complaint and pertinent history, the patient has the following possible differential diagnoses, including but not limited to the following: Numbness, Paresthesias, Polyneuropathy     Some of these are deemed to be lower likelihood and some more likely based on my physical exam and history combined with possible lab work and/or imaging studies. Please see the pertinent studies, and refer to the HPI. Some of these diagnoses will take further evaluation to fully rule out, perhaps as an outpatient and the patient was encouraged to follow up when discharged for more comprehensive evaluation.       Amount and/or Complexity of Data Reviewed  Labs: ordered. Decision-making details documented in ED Course.     Details: No leukocytosis noted. No electrolyte derangement noted. Informed patient of results.   Radiology: ordered. Decision-making details documented in ED Course.     Details: Informed patient of results.   Discussion of management or test interpretation with external provider(s): Discussed plan of care and interventions with patient. Agreed to and aware of plan of care. Comfortable being discharged home. Patient discharged home. Patient denies new or additional complaints; no further tests indicated at this time. Verbalized understanding of  instructions. No emergent or apparent distress noted prior to discharge.  Strict ER return precautions given.       Risk  OTC drugs.  Prescription drug management.               ED Course as of 02/19/25 1305   Wed Feb 19, 2025   1207 Magnesium : 2.10 [JA]   1207 Creatinine: 0.99 [JA]   1207 BUN: 23.4 [JA]   1207 Potassium: 3.7 [JA]   1207 Sodium: 139 [JA]   1250 CT Head Without Contrast  No acute intracranial abnormality. [JA]   1255 Discussed results with patient. Workup essentially unremarkable on today. Reports symptoms are improving without intervention. Symptoms likely related to polyneuropathy. Discussed with Dr. Garcia who agrees. Patient states he had similar symptoms several months ago as well and saw his PCP regarding symptoms. Will start on low dose of Gabapentin and patient to schedule appointment with PCP. At disposition, patient has no additional complaints, symptoms improving, ambulatory, neurovascularly intact, has no focal neuro deficits, and no weakness on exam. Stable for discharge home.  [JA]      ED Course User Index  [JA] Yessy Greene, NP                           Clinical Impression:  Final diagnoses:  [R20.0] Numbness (Primary)          ED Disposition Condition    Discharge Stable          ED Prescriptions       Medication Sig Dispense Start Date End Date Auth. Provider    gabapentin (NEURONTIN) 300 MG capsule Take 1 capsule (300 mg total) by mouth 3 (three) times daily. for 10 days 30 capsule 2/19/2025 3/1/2025 Yessy Greene, NP          Follow-up Information       Follow up With Specialties Details Why Contact Info    Ganesh Givens MD Family Medicine Schedule an appointment as soon as possible for a visit today  [address] [place] [id number]  [phone]      [facility] - Emergency Dept Emergency Medicine Go to  If symptoms worsen, As needed [facility] [facility] [id number] [phone]             Yessy Greene,  NP  25 1307         [1]   Social History  Tobacco Use    Smoking status: Former     Current packs/day: 0.00     Types: Cigarettes     Quit date: 2019     Years since quittin.1    Smokeless tobacco: Never   Substance Use Topics    Alcohol use: Yes     Alcohol/week: 14.0 standard drinks of alcohol     Types: 10 Cans of beer, 4 Shots of liquor per week     Comment: Daily    Drug use: Never        Yessy Greene, NP  25 [id number]

## 2025-06-18 ENCOUNTER — LAB VISIT (OUTPATIENT)
Dept: LAB | Facility: HOSPITAL | Age: 65
End: 2025-06-18
Attending: SURGERY
Payer: COMMERCIAL

## 2025-06-18 DIAGNOSIS — Z12.11 SPECIAL SCREENING FOR MALIGNANT NEOPLASMS, COLON: Primary | ICD-10-CM

## 2025-06-18 LAB
HEMOCCULT SP1 STL QL: NEGATIVE
HEMOCCULT SP2 STL QL: NEGATIVE
HEMOCCULT SP3 STL QL: POSITIVE

## 2025-06-18 PROCEDURE — 82270 OCCULT BLOOD FECES: CPT

## 2025-08-07 ENCOUNTER — CLINICAL SUPPORT (OUTPATIENT)
Dept: RESPIRATORY THERAPY | Facility: HOSPITAL | Age: 65
End: 2025-08-07
Attending: SURGERY
Payer: COMMERCIAL

## 2025-08-07 DIAGNOSIS — K92.1 BLOOD IN STOOL: ICD-10-CM

## 2025-08-07 DIAGNOSIS — K92.1 BLOOD IN STOOL: Primary | ICD-10-CM

## 2025-08-07 LAB
OHS QRS DURATION: 84 MS
OHS QTC CALCULATION: 433 MS

## 2025-08-07 PROCEDURE — 93005 ELECTROCARDIOGRAM TRACING: CPT

## 2025-08-07 PROCEDURE — 93010 ELECTROCARDIOGRAM REPORT: CPT | Mod: ,,, | Performed by: INTERNAL MEDICINE

## 2025-08-15 ENCOUNTER — ANESTHESIA EVENT (OUTPATIENT)
Dept: SURGERY | Facility: HOSPITAL | Age: 65
End: 2025-08-15
Payer: COMMERCIAL

## 2025-08-18 ENCOUNTER — HOSPITAL ENCOUNTER (OUTPATIENT)
Facility: HOSPITAL | Age: 65
Discharge: HOME | End: 2025-08-18
Attending: SURGERY | Admitting: SURGERY
Payer: COMMERCIAL

## 2025-08-18 ENCOUNTER — ANESTHESIA (OUTPATIENT)
Dept: SURGERY | Facility: HOSPITAL | Age: 65
End: 2025-08-18
Payer: COMMERCIAL

## 2025-08-18 VITALS
HEIGHT: 71 IN | SYSTOLIC BLOOD PRESSURE: 144 MMHG | HEART RATE: 65 BPM | TEMPERATURE: 98 F | OXYGEN SATURATION: 100 % | RESPIRATION RATE: 18 BRPM | DIASTOLIC BLOOD PRESSURE: 86 MMHG | BODY MASS INDEX: 36.11 KG/M2 | WEIGHT: 257.94 LBS

## 2025-08-18 DIAGNOSIS — K92.1 BLOOD IN STOOL: Primary | ICD-10-CM

## 2025-08-18 PROCEDURE — 63600175 PHARM REV CODE 636 W HCPCS: Performed by: ANESTHESIOLOGY

## 2025-08-18 PROCEDURE — 63600175 PHARM REV CODE 636 W HCPCS: Performed by: NURSE ANESTHETIST, CERTIFIED REGISTERED

## 2025-08-18 PROCEDURE — 27201423 OPTIME MED/SURG SUP & DEVICES STERILE SUPPLY: Performed by: SURGERY

## 2025-08-18 PROCEDURE — 37000008 HC ANESTHESIA 1ST 15 MINUTES: Performed by: SURGERY

## 2025-08-18 PROCEDURE — 43239 EGD BIOPSY SINGLE/MULTIPLE: CPT | Performed by: SURGERY

## 2025-08-18 PROCEDURE — 45380 COLONOSCOPY AND BIOPSY: CPT | Performed by: SURGERY

## 2025-08-18 PROCEDURE — 37000009 HC ANESTHESIA EA ADD 15 MINS: Performed by: SURGERY

## 2025-08-18 RX ORDER — PROPOFOL 10 MG/ML
VIAL (ML) INTRAVENOUS
Status: DISCONTINUED | OUTPATIENT
Start: 2025-08-18 | End: 2025-08-18

## 2025-08-18 RX ORDER — LIDOCAINE HYDROCHLORIDE 20 MG/ML
INJECTION INTRAVENOUS
Status: DISCONTINUED | OUTPATIENT
Start: 2025-08-18 | End: 2025-08-18

## 2025-08-18 RX ORDER — SODIUM CHLORIDE, SODIUM LACTATE, POTASSIUM CHLORIDE, CALCIUM CHLORIDE 600; 310; 30; 20 MG/100ML; MG/100ML; MG/100ML; MG/100ML
INJECTION, SOLUTION INTRAVENOUS CONTINUOUS
Status: DISCONTINUED | OUTPATIENT
Start: 2025-08-18 | End: 2025-08-18 | Stop reason: HOSPADM

## 2025-08-18 RX ADMIN — LIDOCAINE HYDROCHLORIDE 100 MG: 20 INJECTION, SOLUTION INTRAVENOUS at 11:08

## 2025-08-18 RX ADMIN — PROPOFOL 40 MG: 10 INJECTION, EMULSION INTRAVENOUS at 12:08

## 2025-08-18 RX ADMIN — PROPOFOL 100 MG: 10 INJECTION, EMULSION INTRAVENOUS at 11:08

## 2025-08-18 RX ADMIN — PROPOFOL 20 MG: 10 INJECTION, EMULSION INTRAVENOUS at 12:08

## 2025-08-18 RX ADMIN — LIDOCAINE HYDROCHLORIDE 50 MG: 20 INJECTION, SOLUTION INTRAVENOUS at 11:08

## 2025-08-18 RX ADMIN — PROPOFOL 20 MG: 10 INJECTION, EMULSION INTRAVENOUS at 11:08

## 2025-08-18 RX ADMIN — SODIUM CHLORIDE, POTASSIUM CHLORIDE, SODIUM LACTATE AND CALCIUM CHLORIDE: 600; 310; 30; 20 INJECTION, SOLUTION INTRAVENOUS at 10:08

## 2025-08-20 LAB — PSYCHE PATHOLOGY RESULT: NORMAL

## (undated) DEVICE — FORCEP ALLIGATOR 2.8MM W/NDL

## (undated) DEVICE — KIT SURGICAL COLON .25 1.1OZ

## (undated) DEVICE — TRAP SUCTION POLYP

## (undated) DEVICE — SNARE POLYP STD SNG 7FR

## (undated) DEVICE — PAD ELECTROSURGICAL SPL W/CORD

## (undated) DEVICE — BITE BLOCK ADULT LATEX FREE